# Patient Record
Sex: FEMALE | Race: WHITE | NOT HISPANIC OR LATINO | Employment: OTHER | URBAN - METROPOLITAN AREA
[De-identification: names, ages, dates, MRNs, and addresses within clinical notes are randomized per-mention and may not be internally consistent; named-entity substitution may affect disease eponyms.]

---

## 2023-10-11 DIAGNOSIS — M25.561 ACUTE PAIN OF RIGHT KNEE: ICD-10-CM

## 2023-10-11 RX ORDER — ATORVASTATIN CALCIUM 40 MG/1
40 TABLET, FILM COATED ORAL DAILY
COMMUNITY
Start: 2023-09-14

## 2023-10-11 RX ORDER — LORATADINE 10 MG/1
10 TABLET ORAL DAILY
COMMUNITY
Start: 2023-10-02

## 2023-10-11 RX ORDER — METOPROLOL TARTRATE 25 MG/1
25 TABLET, FILM COATED ORAL 2 TIMES DAILY
COMMUNITY
Start: 2023-09-14

## 2023-10-11 RX ORDER — CITALOPRAM 20 MG/1
20 TABLET, FILM COATED ORAL DAILY
COMMUNITY
Start: 2023-10-02

## 2023-10-11 RX ORDER — LOSARTAN POTASSIUM 100 MG/1
100 TABLET ORAL DAILY
COMMUNITY
Start: 2023-09-14

## 2023-10-11 RX ORDER — ARIPIPRAZOLE 2 MG/1
4 TABLET ORAL DAILY
COMMUNITY
Start: 2023-10-02 | End: 2024-05-14 | Stop reason: ALTCHOICE

## 2023-10-11 RX ORDER — AMOXICILLIN 500 MG/1
2000 CAPSULE ORAL
COMMUNITY
Start: 2023-06-01 | End: 2024-05-14 | Stop reason: ALTCHOICE

## 2023-10-11 RX ORDER — CLONAZEPAM 1 MG/1
1 TABLET ORAL 2 TIMES DAILY PRN
COMMUNITY
Start: 2023-09-22 | End: 2024-05-14 | Stop reason: ALTCHOICE

## 2023-10-12 ENCOUNTER — ANCILLARY PROCEDURE (OUTPATIENT)
Dept: RADIOLOGY | Facility: CLINIC | Age: 68
End: 2023-10-12
Payer: COMMERCIAL

## 2023-10-12 ENCOUNTER — OFFICE VISIT (OUTPATIENT)
Dept: ORTHOPEDIC SURGERY | Facility: CLINIC | Age: 68
End: 2023-10-12
Payer: COMMERCIAL

## 2023-10-12 DIAGNOSIS — M17.11 ARTHRITIS OF RIGHT KNEE: Primary | ICD-10-CM

## 2023-10-12 DIAGNOSIS — M25.561 ACUTE PAIN OF RIGHT KNEE: ICD-10-CM

## 2023-10-12 DIAGNOSIS — M25.561 CHRONIC PAIN OF RIGHT KNEE: ICD-10-CM

## 2023-10-12 DIAGNOSIS — G89.29 CHRONIC PAIN OF RIGHT KNEE: ICD-10-CM

## 2023-10-12 PROCEDURE — 1160F RVW MEDS BY RX/DR IN RCRD: CPT | Performed by: ORTHOPAEDIC SURGERY

## 2023-10-12 PROCEDURE — 1036F TOBACCO NON-USER: CPT | Performed by: ORTHOPAEDIC SURGERY

## 2023-10-12 PROCEDURE — 73562 X-RAY EXAM OF KNEE 3: CPT | Mod: RT,FY

## 2023-10-12 PROCEDURE — 73562 X-RAY EXAM OF KNEE 3: CPT | Mod: RIGHT SIDE | Performed by: RADIOLOGY

## 2023-10-12 PROCEDURE — 20610 DRAIN/INJ JOINT/BURSA W/O US: CPT | Performed by: ORTHOPAEDIC SURGERY

## 2023-10-12 PROCEDURE — 1159F MED LIST DOCD IN RCRD: CPT | Performed by: ORTHOPAEDIC SURGERY

## 2023-10-12 PROCEDURE — 99203 OFFICE O/P NEW LOW 30 MIN: CPT | Performed by: ORTHOPAEDIC SURGERY

## 2023-10-12 RX ORDER — LIDOCAINE HYDROCHLORIDE 10 MG/ML
2 INJECTION INFILTRATION; PERINEURAL
Status: COMPLETED | OUTPATIENT
Start: 2023-10-12 | End: 2023-10-12

## 2023-10-12 RX ORDER — TRIAMCINOLONE ACETONIDE 40 MG/ML
40 INJECTION, SUSPENSION INTRA-ARTICULAR; INTRAMUSCULAR
Status: COMPLETED | OUTPATIENT
Start: 2023-10-12 | End: 2023-10-12

## 2023-10-12 RX ADMIN — TRIAMCINOLONE ACETONIDE 40 MG: 40 INJECTION, SUSPENSION INTRA-ARTICULAR; INTRAMUSCULAR at 15:16

## 2023-10-12 RX ADMIN — LIDOCAINE HYDROCHLORIDE 2 ML: 10 INJECTION INFILTRATION; PERINEURAL at 15:16

## 2023-10-12 ASSESSMENT — ENCOUNTER SYMPTOMS: KNEE SWELLING: 1

## 2023-10-12 NOTE — PROGRESS NOTES
Subjective    Patient ID: TOSHIA Orosoc is a 68 y.o. female.    Chief Complaint: Pain of the Right Knee       68-year-old female presents for evaluation of right knee pain.  She states this has been ongoing for many years.  She has tried rest and modifications of activities as well as attempts at weight reduction which has been of little benefit.  She also occasionally uses oral anti-inflammatories over-the-counter which also have been of limited benefit.  He describes majority the pain along the medial aspect of the right knee.  Stiffness after prolonged sitting.  Pain which limits functions of ADL including standing, walking and stair climbing.  Has done well after previous left TKA performed 13 years ago.    This patient's past medical, social, and family history were reviewed as well as a review of systems including updates on the patient's information encounter sheet  Denies history diabetes    Physical Examination  Constitutional: Patient's height and weight reviewed, appears well kempt  Psychiatric: Alert and oriented ×3, appropriate mood and behavior  Pulmonary: Breathing appears nonlabored, no apparent distress  Lymphatic: No appreciable lymphadenopathy to both the upper and lower extremities  Skin: No open lesions, rashes, ulcerations  Neurologic: Gross motor and sensory exam appear intact (except for abnormalities noted in the below muscle skeletal exam)    Musculoskeletal: There appears to be satisfactory range of motion of the right hip without groin or thigh pain elicited.  The right knee reveals a lack of extension of 5 degrees and flexion limited to 115 degrees.  Patellofemoral crepitus with range of motion.  Localized medial joint line tenderness.  Mild varus alignment that does not correct to valgus stress.    X-rays reviewed today demonstrate arthritic changes right knee with near complete obliteration of medial joint space and osteophyte formation.    Assessment: Osteoarthritis right  knee    Plan: An extended discussion ensued with the patient regarding the treatment options for their knee condition. This included both nonoperative and operative treatment options.. The patient will continue with modifications of activities of daily living as well as an exercise program. As the patient desired an intra-articular knee injection of Kenalog/lidocaine was performed today and tolerated well.. The patient will observe to see if the injection is of benefit. Follow-up on a when necessary basis.    If the patient does not see improvement with this injection she will consider an evaluation by Dr. Miranda if she wishes to consider knee replacement in the winter 2024.    Right Knee     L Inj/Asp: R knee on 10/12/2023 3:16 PM  Indications: pain  Details: 22 G needle, anterolateral approach  Medications: 40 mg triamcinolone acetonide 40 mg/mL; 2 mL lidocaine 10 mg/mL (1 %)  Consent was given by the patient. Patient was prepped and draped in the usual sterile fashion.              Current Outpatient Medications:     ARIPiprazole (Abilify) 2 mg tablet, Take 2 tablets (4 mg) by mouth once daily., Disp: , Rfl:     atorvastatin (Lipitor) 40 mg tablet, Take 1 tablet (40 mg) by mouth once daily., Disp: , Rfl:     citalopram (CeleXA) 20 mg tablet, Take 1 tablet (20 mg) by mouth once daily., Disp: , Rfl:     loratadine (Claritin) 10 mg tablet, Take 1 tablet (10 mg) by mouth once daily., Disp: , Rfl:     losartan (Cozaar) 100 mg tablet, Take 1 tablet (100 mg) by mouth once daily., Disp: , Rfl:     metoprolol tartrate (Lopressor) 25 mg tablet, Take 1 tablet (25 mg) by mouth 2 times a day., Disp: , Rfl:     amoxicillin (Amoxil) 500 mg capsule, Take 4 capsules (2,000 mg) by mouth.  one hour prior to appointment, Disp: , Rfl:     clonazePAM (KlonoPIN) 1 mg tablet, Take 1 tablet (1 mg) by mouth 2 times a day as needed., Disp: , Rfl:

## 2024-04-30 ENCOUNTER — OFFICE VISIT (OUTPATIENT)
Dept: ORTHOPEDIC SURGERY | Facility: CLINIC | Age: 69
End: 2024-04-30
Payer: COMMERCIAL

## 2024-04-30 VITALS — WEIGHT: 186 LBS | BODY MASS INDEX: 32.96 KG/M2 | HEIGHT: 63 IN

## 2024-04-30 DIAGNOSIS — M17.11 ARTHRITIS OF KNEE, RIGHT: Primary | ICD-10-CM

## 2024-04-30 PROCEDURE — 1159F MED LIST DOCD IN RCRD: CPT | Performed by: ORTHOPAEDIC SURGERY

## 2024-04-30 PROCEDURE — 1160F RVW MEDS BY RX/DR IN RCRD: CPT | Performed by: ORTHOPAEDIC SURGERY

## 2024-04-30 PROCEDURE — 1036F TOBACCO NON-USER: CPT | Performed by: ORTHOPAEDIC SURGERY

## 2024-04-30 PROCEDURE — 99214 OFFICE O/P EST MOD 30 MIN: CPT | Performed by: ORTHOPAEDIC SURGERY

## 2024-04-30 RX ORDER — SODIUM CHLORIDE, SODIUM LACTATE, POTASSIUM CHLORIDE, CALCIUM CHLORIDE 600; 310; 30; 20 MG/100ML; MG/100ML; MG/100ML; MG/100ML
100 INJECTION, SOLUTION INTRAVENOUS CONTINUOUS
Status: CANCELLED | OUTPATIENT
Start: 2024-05-29

## 2024-04-30 RX ORDER — CEFAZOLIN SODIUM 2 G/100ML
2 INJECTION, SOLUTION INTRAVENOUS ONCE
Status: CANCELLED | OUTPATIENT
Start: 2024-05-29 | End: 2024-04-30

## 2024-04-30 RX ORDER — NAPROXEN SODIUM 220 MG/1
81 TABLET, FILM COATED ORAL DAILY
COMMUNITY
End: 2024-05-30 | Stop reason: HOSPADM

## 2024-04-30 NOTE — PROGRESS NOTES
68-year-old is seen with right knee pain.  She has been having persistent severe sharp shooting pain in the right knee.  Pain is worse with standing and walking.  Pain is worse going up and down stairs and getting up and down from a chair in and out of a car.  She is severely debilitated with the knee pain.  She has had pain for many years.  She had cortisone injection without relief.  She had viscosupplementation on her left knee that did not help her.  She has had her left knee replaced and is very satisfied with this.  She takes Tylenol and ibuprofen with minimal improvement.  She has a history of an MI in 2014 and has had stents placed.  No recent cardiac issues.    Pleasant and no acute distress. Walks with antalgic gait. Stands with varus alignment of the right knee and neutral on the left.  Right knee range of motion is 5-110°. The knee is stable to varus and valgus stress Lachman and posterior drawer. There is a mild effusion. There is generalized tenderness. Left knee range of motion is 0-120°. There is no effusion. The knee is stable to varus and valgus stress Lachman and posterior drawer. Both lower extremities are well perfused the skin is intact and muscle tone is adequate.    Multiple x-ray views of the right knee are personally reviewed and these demonstrate advanced degenerative changes with complete loss of joint space and osteophyte formation and subchondral sclerosis and cystic change.    The patient has undergone extensive conservative treatment but has persistent severe debilitating pain and advanced degenerative changes on x-ray.  Treatment options including no treatment reviewed and the decision was made to proceed with right total knee arthroplasty.  The surgery and postoperative course were reviewed in detail.  Risks including but not limited to infection thromboembolus neurovascular injury fracture bleeding medical problems stiffness and implant failure or loosening were discussed and they  understand this and have elected to proceed.  They will have medical clearance.  Avoidance of aggravating activities will be done in the meantime.  Intravenous TXA will be used at the time of the procedure.

## 2024-05-01 PROBLEM — M17.11 ARTHRITIS OF KNEE, RIGHT: Status: ACTIVE | Noted: 2024-04-30

## 2024-05-14 ENCOUNTER — PRE-ADMISSION TESTING (OUTPATIENT)
Dept: PREADMISSION TESTING | Facility: HOSPITAL | Age: 69
End: 2024-05-14
Payer: MEDICARE

## 2024-05-14 ENCOUNTER — HOSPITAL ENCOUNTER (OUTPATIENT)
Dept: RADIOLOGY | Facility: HOSPITAL | Age: 69
Discharge: HOME | End: 2024-05-14
Payer: MEDICARE

## 2024-05-14 VITALS
BODY MASS INDEX: 33.83 KG/M2 | RESPIRATION RATE: 18 BRPM | WEIGHT: 190.92 LBS | SYSTOLIC BLOOD PRESSURE: 169 MMHG | OXYGEN SATURATION: 97 % | DIASTOLIC BLOOD PRESSURE: 95 MMHG | TEMPERATURE: 98.2 F | HEIGHT: 63 IN | HEART RATE: 68 BPM

## 2024-05-14 DIAGNOSIS — M17.11 ARTHRITIS OF KNEE, RIGHT: ICD-10-CM

## 2024-05-14 DIAGNOSIS — T80.1XXA VASCULAR COMPLICATIONS FOLLOWING INFUSION, TRANSFUSION AND THERAPEUTIC INJECTION, INITIAL ENCOUNTER: ICD-10-CM

## 2024-05-14 DIAGNOSIS — Z01.818 PREOP TESTING: Primary | ICD-10-CM

## 2024-05-14 LAB
APTT PPP: 30 SECONDS (ref 27–38)
INR PPP: 0.9 (ref 0.9–1.1)
PROTHROMBIN TIME: 10.5 SECONDS (ref 9.8–12.8)

## 2024-05-14 PROCEDURE — 73562 X-RAY EXAM OF KNEE 3: CPT | Mod: RIGHT SIDE | Performed by: RADIOLOGY

## 2024-05-14 PROCEDURE — 73562 X-RAY EXAM OF KNEE 3: CPT | Mod: RT

## 2024-05-14 PROCEDURE — 87081 CULTURE SCREEN ONLY: CPT | Mod: PARLAB

## 2024-05-14 PROCEDURE — 36415 COLL VENOUS BLD VENIPUNCTURE: CPT

## 2024-05-14 PROCEDURE — 85610 PROTHROMBIN TIME: CPT

## 2024-05-14 RX ORDER — BUTALB/ACETAMINOPHEN/CAFFEINE 50-325-40
1 TABLET ORAL DAILY
COMMUNITY

## 2024-05-14 RX ORDER — OMEGA-3-ACID ETHYL ESTERS 1 G/1
1 CAPSULE, LIQUID FILLED ORAL 2 TIMES DAILY
COMMUNITY
End: 2024-05-30 | Stop reason: HOSPADM

## 2024-05-14 RX ORDER — VITAMIN E MIXED 400 UNIT
400 CAPSULE ORAL DAILY
COMMUNITY
End: 2024-05-30 | Stop reason: HOSPADM

## 2024-05-14 RX ORDER — UBIDECARENONE 75 MG
500 CAPSULE ORAL DAILY
COMMUNITY

## 2024-05-14 RX ORDER — DOCUSATE SODIUM 250 MG
2 CAPSULE ORAL DAILY
COMMUNITY

## 2024-05-14 ASSESSMENT — DUKE ACTIVITY SCORE INDEX (DASI)
CAN YOU WALK INDOORS, SUCH AS AROUND YOUR HOUSE: YES
CAN YOU DO YARD WORK LIKE RAKING LEAVES, WEEDING OR PUSHING A MOWER: NO
CAN YOU DO HEAVY WORK AROUND THE HOUSE LIKE SCRUBBING FLOORS OR LIFTING AND MOVING HEAVY FURNITURE: NO
CAN YOU TAKE CARE OF YOURSELF (EAT, DRESS, BATHE, OR USE TOILET): YES
CAN YOU WALK A BLOCK OR TWO ON LEVEL GROUND: YES
CAN YOU CLIMB A FLIGHT OF STAIRS OR WALK UP A HILL: YES
CAN YOU PARTICIPATE IN MODERATE RECREATIONAL ACTIVITIES LIKE GOLF, BOWLING, DANCING, DOUBLES TENNIS OR THROWING A BASEBALL OR FOOTBALL: NO
CAN YOU RUN A SHORT DISTANCE: NO
CAN YOU HAVE SEXUAL RELATIONS: NO
TOTAL_SCORE: 18.95
CAN YOU DO MODERATE WORK AROUND THE HOUSE LIKE VACUUMING, SWEEPING FLOORS OR CARRYING GROCERIES: YES
CAN YOU DO LIGHT WORK AROUND THE HOUSE LIKE DUSTING OR WASHING DISHES: YES
DASI METS SCORE: 5.1
CAN YOU PARTICIPATE IN STRENOUS SPORTS LIKE SWIMMING, SINGLES TENNIS, FOOTBALL, BASKETBALL, OR SKIING: NO

## 2024-05-14 NOTE — PREPROCEDURE INSTRUCTIONS
Medication List            Accurate as of May 14, 2024 11:37 AM. Always use your most recent med list.                aspirin 81 mg chewable tablet  Medication Adjustments for Surgery: Other (Comment)  Notes to patient: Stop 5 days before surgery     atorvastatin 40 mg tablet  Commonly known as: Lipitor  Medication Adjustments for Surgery: Take morning of surgery with sip of water, no other fluids     calcium citrate-vitamin D3 315 mg-5 mcg (200 unit) tablet  Commonly known as: Citracal+D  Medication Adjustments for Surgery: Stop 7 days before surgery     citalopram 20 mg tablet  Commonly known as: CeleXA  Medication Adjustments for Surgery: Take morning of surgery with sip of water, no other fluids     cyanocobalamin 500 mcg tablet  Commonly known as: Vitamin B-12  Medication Adjustments for Surgery: Stop 7 days before surgery     docusate sodium 250 mg capsule  Medication Adjustments for Surgery: Stop 1 day before surgery     loratadine 10 mg tablet  Commonly known as: Claritin  Medication Adjustments for Surgery: Take morning of surgery with sip of water, no other fluids     losartan 100 mg tablet  Commonly known as: Cozaar  Medication Adjustments for Surgery: Other (Comment)  Notes to patient: Hold the night before and the day of surgery     metoprolol tartrate 25 mg tablet  Commonly known as: Lopressor  Medication Adjustments for Surgery: Take morning of surgery with sip of water, no other fluids     omega-3 acid ethyl esters 1 gram capsule  Commonly known as: Lovaza  Medication Adjustments for Surgery: Stop 7 days before surgery     vitamin E 180 mg (400 unit) capsule  Medication Adjustments for Surgery: Stop 7 days before surgery                        PRE-OPERATIVE INSTRUCTIONS FOR SURGERY    *Do not eat anything after midnight the night of surgery.  This includes food of any kind (including hard candy, cough drops, mints).   You may have up to 13.5 ounces of clear liquid  until TWO hours prior to your  arrival time to the hospital.  This includes water, black tea/coffee, (no milk or cream) apple juice and electrolyte drinks (GATORADE).  You may chew gum until TWO hours prior you your surgery/procedure.         *One of our staff members will call you ONE business day before your surgery, between 11 am-2 pm to let you know the time to arrive.  If you have not received a call by 2 pm, call [phone]  *When you arrive at the hospital-->GO TO Registration on the ground floor  *Stop smoking 24 hours prior to surgery.  No Marijuana, CBD Oil or Vaping for 48 hours  *No alcohol 24 hours prior to surgery  *You will need a responsible adult to drive you home  -No acrylic nails or nail polish on at least one fingernail, NO polish on toes for foot surgery  -You may be asked to remove your dentures, partial plate, eyeglasses or contact lenses before going to surgery.  Please bring a case for these items.  -Body piercings need to be removed.  Jewelry and valuables should be left at home.  -Put on loose,  comfortable, clean clothing, that will accommodate bandages        What is a home antibacterial shower?  This shower is a way of cleaning the skin with a germ killing solution before surgery.  The solution contains chlorhexidine, commonly known as CHG.  CHG is a skin cleanser with germ killing ability.  Let your doctor know if you are allergic to chlorhexidine.    Why do I need to take a preoperative antibacterial shower?  Skin is not sterile.  It is best to try to make your skin as free of germs as possible before surgery.  Proper cleansing with a germ killing soap before surgery can lower the number of germs on your skin.  This helps to reduce the risk of infection at the surgical site.  Following the instructions listed below will help you prepare your skin for surgery.      How do I use the solution?    Steps: Begin using your CHG soap 5 days before your surgery on __________________.    *First, wash and rinse your hair  using the CHG soap.  Keep CHG soap away from ear canals and eyes.   Rinse completely, do not condition.  Hair extensions should be removed.    *Wash your face with your normal soap and rinse.   *Apply the CHG solution to a clean wet washcloth.  Turn the water off or move away from the water spray to avoid premature rinsing of the CHG soap as you are applying.  Firmly lather your entire body from the neck down.  Do not use on your face.    *Pay special attention to the area(s) where your incision(s) will be located unless they are on your face.  Avoid scrubbing your skin too hard.  The important part is to have the CHG soap sit on your skin for 3 minutes.   *When the 3 minutes are up, turn on the water and rinse the CHG solution off your body completely.  *Do not wash with regular soap after you  have  used the CHG soap solution.  *Pat  yourself dry with a clean, freshly laundered towel.  *Do not apply powders, deodorants or lotions.  *Dress in clean freshly laundered night clothes.    *Be sure to sleep with clean freshly laundered sheets.    *Be aware the CHG will cause stains on fabrics; if you wash them with bleach after use.  Rinse your washcloth and other linens that have contact with CHG completely.  Use only non-chlorine detergents to launder the items  used.  *The morning of surgery is the fifth day.  Repeat the above steps and dress in clean comfortable clothing.     What is oral/dental rinse?  It is mouthwash.  It is a way of cleaning the he mouth with a germ-killing solution before your surgery.  The solution contains chlorhexidine, commonly known as CHG.  It is used inside the mouth to kill a bacteria known as Staphylococcus aureus.  Let your doctor know if you are allergic to Chlorhexidine.    Why do I need to use CHG oral/ dental rinse?  The CHG oral/dental rinse helps to kill bacteria in your mouth know as Staphylococcus aureus.  This reduces the risk of infection at the surgical site.    Using your  CHG oral/dental rinse    STEPS:    Use your CHG oral/dental rinse after you brush your teeth the night before (at bedtime) and the morning of your surgery.  Follow all the directions on your prescription label.  *Use the cap on the container to measure 15 ml  *Swish (gargle if you can) the mouthwash in your mouth for at least 30 seconds, (do not swallow) and spit out  *After you use your CHG rinse, do not rinse your mouth with water, drink or eat.  Please refer to the prescription label for the appropriate time to resume oral intake.    What side effects might I have using the CHG oral/dental rinse?  CHG rinse will stick you plaque on the teeth.  Brush and floss just before use.   Teeth brushing will help avoid staining of the plaque during  use.  Teeth brushing will help avoid staining of plaque during  use.    Who should I contact if I have questions about the CHG oral/dental rinse and or CHG soap?  Please call [facility], Pre-Admission testing at [phone] if you have any questions.    What you may be asked to bring to surgery:  ___Crutches, walker    NPO Instructions:    Do not eat any food after midnight the night before your surgery/procedure.  You may chew gum up to TWO hours before your surgery/procedure.    Additional Instructions:     Day of Surgery:  You may chew gum up to TWO hours before your surgery/procedure  Wear  comfortable loose fitting clothing  Do not use moisturizers, creams, lotions or perfume  All jewelry and valuables should be left at home

## 2024-05-14 NOTE — CPM/PAT H&P
CPM/PAT Evaluation       Name: Thelma Orosco (Thelma Orosco)  /Age: 1955/68 y.o.     In-Person       Chief Complaint: R knee pain    HPI    Ms. Orosco is a pleasant 68 year old female who presents to the clinic today for preoperative evaluation for upcoming Right Knee Arthroplasty with Dr. Miranda on 2024.    She reports her right knee pain started 5 years ago and getting progressively worse in the past year. In the past she has tried cortisone injections, however recently, with the last one being  she had one without much relief. She reports the pain primarily on the lateral side of the patella radiating down the lateral aspect of the right shin.     She reports her pain to be intermittent, throbbing and sharp, which gets worse when walking, however can also be present when she is laying down in bed as well.  Onset: 5 years ago, has been getting worse in the past year. Outer lateral side radiating down the lateral aspect of the shin. Her pain also does improve with stretching.    She reports taking 2- extra strength tylenol once daily alternating on other days with 3 aleve tablets.     Review of Systems  Constitutional: NO F, chills, +intermittent hot flashes  Eyes: + glasses no blurred vision or visual disturbance  ENT: denies congestion, sore throat, difficulty hearing  Cardiovascular: no chest pain, no edema, no palps and no syncope.   Respiratory: no cough,no s.o.b. and no wheezing  Gastrointestinal: no abdominal pain, no N/V, no blood in stools  Genitourinary: no dysuria, no urinary frequency, no urinary hesitancy and no feelings of urinary urgency.   Musculoskeletal: +right knee pain,  no back pain and no myalgias.   Integumentary: no new skin lesions and no rashes.   Neurological: no difficulty walking, no headache, no limb weakness, no numbness and no tingling.   Endocrine: no recent weight gain and no recent weight loss.   Hematologic/Lymphatic: no tendency for easy bruising and  no swollen glands.           Past Medical History:   Diagnosis Date    Coronary artery disease     Depression     Hyperlipidemia     Hypertension     Myocardial infarction (Multi)        Past Surgical History:   Procedure Laterality Date    CARDIAC CATHETERIZATION      CORONARY STENT PLACEMENT         Patient  has no history on file for sexual activity.    No family history on file.    Allergies   Allergen Reactions    Codeine Other       Prior to Admission medications    Medication Sig Start Date End Date Taking? Authorizing Provider   aspirin 81 mg chewable tablet Chew 1 tablet (81 mg) once daily.   Yes Historical Provider, MD   atorvastatin (Lipitor) 40 mg tablet Take 1 tablet (40 mg) by mouth once daily. 9/14/23  Yes Historical Provider, MD   citalopram (CeleXA) 20 mg tablet Take 1 tablet (20 mg) by mouth once daily. 10/2/23  Yes Historical Provider, MD   loratadine (Claritin) 10 mg tablet Take 1 tablet (10 mg) by mouth once daily. 10/2/23  Yes Historical Provider, MD   losartan (Cozaar) 100 mg tablet Take 1 tablet (100 mg) by mouth once daily. 9/14/23  Yes Historical Provider, MD   metoprolol tartrate (Lopressor) 25 mg tablet Take 1 tablet (25 mg) by mouth 2 times a day. 9/14/23  Yes Historical Provider, MD   calcium citrate-vitamin D3 (Citracal+D) 315 mg-5 mcg (200 unit) tablet Take 1 tablet by mouth once daily.    Historical Provider, MD   cyanocobalamin (Vitamin B-12) 500 mcg tablet Take 1 tablet (500 mcg) by mouth once daily.    Historical Provider, MD   docusate sodium 250 mg capsule Take by mouth.    Historical Provider, MD   omega-3 acid ethyl esters (Lovaza) 1 gram capsule Take 1 capsule (1 g) by mouth 2 times a day.    Historical Provider, MD   vitamin E 180 mg (400 unit) capsule Take 1 capsule (400 Units) by mouth once daily.    Historical Provider, MD   amoxicillin (Amoxil) 500 mg capsule Take 4 capsules (2,000 mg) by mouth.  one hour prior to appointment 6/1/23 5/14/24  Historical Provider, MD    ARIPiprazole (Abilify) 2 mg tablet Take 2 tablets (4 mg) by mouth once daily. 10/2/23 5/14/24  Historical Provider, MD   clonazePAM (KlonoPIN) 1 mg tablet Take 1 tablet (1 mg) by mouth 2 times a day as needed. 9/22/23 5/14/24  Historical Provider, MD          Physical Exam  Constitutional:       Appearance: Normal appearance.   HENT:      Head: Normocephalic and atraumatic.      Nose: Nose normal.      Mouth/Throat:      Mouth: Mucous membranes are moist.   Eyes:      Conjunctiva/sclera: Conjunctivae normal.   Cardiovascular:      Rate and Rhythm: Regular rhythm.   Pulmonary:      Effort: Pulmonary effort is normal.      Breath sounds: Normal breath sounds.   Abdominal:      General: Bowel sounds are normal.      Palpations: Abdomen is soft.   Musculoskeletal:      Cervical back: Normal range of motion.      Left knee: Swelling present. Decreased range of motion. Tenderness present.      Comments: + antalgic gait   Skin:     General: Skin is dry.   Neurological:      General: No focal deficit present.      Mental Status: She is alert and oriented to person, place, and time.          PAT AIRWAY:   Airway:     Mallampati::  IV    TM distance::  >3 FB      Visit Vitals  BP (!) 169/95   Pulse 68   Temp 36.8 °C (98.2 °F) (Temporal)   Resp 18       DASI Risk Score      Flowsheet Row Most Recent Value   DASI SCORE 18.95   METS Score (Will be calculated only when all the questions are answered) 5.1          Caprini DVT Assessment    No data to display       Modified Frailty Index    No data to display       CHADS2 Stroke Risk  Current as of about an hour ago        N/A 3 to 100%: High Risk   2 to < 3%: Medium Risk   0 to < 2%: Low Risk     Last Change: N/A          This score determines the patient's risk of having a stroke if the patient has atrial fibrillation.        This score is not applicable to this patient. Components are not calculated.          Revised Cardiac Risk Index    No data to display       Apfel  Simplified Score    No data to display       Risk Analysis Index Results This Encounter    No data found in the last 1 encounters.       Stop Bang Score      Flowsheet Row Most Recent Value   Do you snore loudly? 1   Do you often feel tired or fatigued after your sleep? 0   Has anyone ever observed you stop breathing in your sleep? 0   Do you have or are you being treated for high blood pressure? 1   Recent BMI (Calculated) 33   Is BMI greater than 35 kg/m2? 0=No   Age older than 50 years old? 1=Yes   Is your neck circumference greater than 17 inches (Male) or 16 inches (Female)? 0   Gender - Male 0=No   STOP-BANG Total Score 3          NP student attestation:    Trainee role CHARLENE student: Vicki Aponte  Trainee discussed patient with JUDY Lyle    Trainee role:         I was present with the trainee who participated in the documentation of this note. I have personally seen and re-examined the patient, and performed the medical decision-making components, assessment and plan of care. I have reviewed the trainee's documentation and verified the findings in the note as written, with additions or exceptions as stated in the body of this note.      67 yo female with c/o right knee pain  She c/o pain to lateral aspect of knee with radiation to mid shin.  She states injections not helpful anymore.  She rates pain a 5/10, and treats with Alleve or Tylenol PRN.    Right total knee replacement is scheduled 5/29/2024 with Dr. Miranda.    ROS/PE-->see above.      ASSESSMENT  Obesity BMI 33.82    NSTEMI/HLD/HTN  Takes ASA, atorvastatin, losartan, metoprolol  H/O ALENA --> LAD 5/2025, Dr. Cosme  Reji  followed Dr. Bauman/ [facility]--now following PCP  Last echo 5/2015 LVEF 60-65%     ECG 5/6/2024 NSR, 62 bpm         ANESTHESIA FINDINGS:  Intubation History: No history of difficult intubation  Significant Anesthesia Considerations:      Airway History: No abnormal airway history  Predictors of Difficult Airway Management  ?  Obesity  ? Short thick neck     PLAN  This patient is optimally prepared for surgery.     CONSULTS:    Patient does not require consults for optimization at this time.     The Following Tests/Procedures Have Been Initiated:  CBC, BMP, Coag screen, MRSA screen, ECG     Planned Anesthetic: general     Instructions Given to Patient:  Patient given verbal and written preop instructions and voices comprehension and compliance.     No further testing required.

## 2024-05-14 NOTE — H&P (VIEW-ONLY)
CPM/PAT Evaluation       Name: Thelma Orosco (Thelma Orosco)  /Age: 1955/68 y.o.     In-Person       Chief Complaint: R knee pain    HPI    Ms. Orosco is a pleasant 68 year old female who presents to the clinic today for preoperative evaluation for upcoming Right Knee Arthroplasty with Dr. Miranda on 2024.    She reports her right knee pain started 5 years ago and getting progressively worse in the past year. In the past she has tried cortisone injections, however recently, with the last one being  she had one without much relief. She reports the pain primarily on the lateral side of the patella radiating down the lateral aspect of the right shin.     She reports her pain to be intermittent, throbbing and sharp, which gets worse when walking, however can also be present when she is laying down in bed as well.  Onset: 5 years ago, has been getting worse in the past year. Outer lateral side radiating down the lateral aspect of the shin. Her pain also does improve with stretching.    She reports taking 2- extra strength tylenol once daily alternating on other days with 3 aleve tablets.     Review of Systems  Constitutional: NO F, chills, +intermittent hot flashes  Eyes: + glasses no blurred vision or visual disturbance  ENT: denies congestion, sore throat, difficulty hearing  Cardiovascular: no chest pain, no edema, no palps and no syncope.   Respiratory: no cough,no s.o.b. and no wheezing  Gastrointestinal: no abdominal pain, no N/V, no blood in stools  Genitourinary: no dysuria, no urinary frequency, no urinary hesitancy and no feelings of urinary urgency.   Musculoskeletal: +right knee pain,  no back pain and no myalgias.   Integumentary: no new skin lesions and no rashes.   Neurological: no difficulty walking, no headache, no limb weakness, no numbness and no tingling.   Endocrine: no recent weight gain and no recent weight loss.   Hematologic/Lymphatic: no tendency for easy bruising and  no swollen glands.           Past Medical History:   Diagnosis Date    Coronary artery disease     Depression     Hyperlipidemia     Hypertension     Myocardial infarction (Multi)        Past Surgical History:   Procedure Laterality Date    CARDIAC CATHETERIZATION      CORONARY STENT PLACEMENT         Patient  has no history on file for sexual activity.    No family history on file.    Allergies   Allergen Reactions    Codeine Other       Prior to Admission medications    Medication Sig Start Date End Date Taking? Authorizing Provider   aspirin 81 mg chewable tablet Chew 1 tablet (81 mg) once daily.   Yes Historical Provider, MD   atorvastatin (Lipitor) 40 mg tablet Take 1 tablet (40 mg) by mouth once daily. 9/14/23  Yes Historical Provider, MD   citalopram (CeleXA) 20 mg tablet Take 1 tablet (20 mg) by mouth once daily. 10/2/23  Yes Historical Provider, MD   loratadine (Claritin) 10 mg tablet Take 1 tablet (10 mg) by mouth once daily. 10/2/23  Yes Historical Provider, MD   losartan (Cozaar) 100 mg tablet Take 1 tablet (100 mg) by mouth once daily. 9/14/23  Yes Historical Provider, MD   metoprolol tartrate (Lopressor) 25 mg tablet Take 1 tablet (25 mg) by mouth 2 times a day. 9/14/23  Yes Historical Provider, MD   calcium citrate-vitamin D3 (Citracal+D) 315 mg-5 mcg (200 unit) tablet Take 1 tablet by mouth once daily.    Historical Provider, MD   cyanocobalamin (Vitamin B-12) 500 mcg tablet Take 1 tablet (500 mcg) by mouth once daily.    Historical Provider, MD   docusate sodium 250 mg capsule Take by mouth.    Historical Provider, MD   omega-3 acid ethyl esters (Lovaza) 1 gram capsule Take 1 capsule (1 g) by mouth 2 times a day.    Historical Provider, MD   vitamin E 180 mg (400 unit) capsule Take 1 capsule (400 Units) by mouth once daily.    Historical Provider, MD   amoxicillin (Amoxil) 500 mg capsule Take 4 capsules (2,000 mg) by mouth.  one hour prior to appointment 6/1/23 5/14/24  Historical Provider, MD    ARIPiprazole (Abilify) 2 mg tablet Take 2 tablets (4 mg) by mouth once daily. 10/2/23 5/14/24  Historical Provider, MD   clonazePAM (KlonoPIN) 1 mg tablet Take 1 tablet (1 mg) by mouth 2 times a day as needed. 9/22/23 5/14/24  Historical Provider, MD          Physical Exam  Constitutional:       Appearance: Normal appearance.   HENT:      Head: Normocephalic and atraumatic.      Nose: Nose normal.      Mouth/Throat:      Mouth: Mucous membranes are moist.   Eyes:      Conjunctiva/sclera: Conjunctivae normal.   Cardiovascular:      Rate and Rhythm: Regular rhythm.   Pulmonary:      Effort: Pulmonary effort is normal.      Breath sounds: Normal breath sounds.   Abdominal:      General: Bowel sounds are normal.      Palpations: Abdomen is soft.   Musculoskeletal:      Cervical back: Normal range of motion.      Left knee: Swelling present. Decreased range of motion. Tenderness present.      Comments: + antalgic gait   Skin:     General: Skin is dry.   Neurological:      General: No focal deficit present.      Mental Status: She is alert and oriented to person, place, and time.          PAT AIRWAY:   Airway:     Mallampati::  IV    TM distance::  >3 FB      Visit Vitals  BP (!) 169/95   Pulse 68   Temp 36.8 °C (98.2 °F) (Temporal)   Resp 18       DASI Risk Score      Flowsheet Row Most Recent Value   DASI SCORE 18.95   METS Score (Will be calculated only when all the questions are answered) 5.1          Caprini DVT Assessment    No data to display       Modified Frailty Index    No data to display       CHADS2 Stroke Risk  Current as of about an hour ago        N/A 3 to 100%: High Risk   2 to < 3%: Medium Risk   0 to < 2%: Low Risk     Last Change: N/A          This score determines the patient's risk of having a stroke if the patient has atrial fibrillation.        This score is not applicable to this patient. Components are not calculated.          Revised Cardiac Risk Index    No data to display       Apfel  Simplified Score    No data to display       Risk Analysis Index Results This Encounter    No data found in the last 1 encounters.       Stop Bang Score      Flowsheet Row Most Recent Value   Do you snore loudly? 1   Do you often feel tired or fatigued after your sleep? 0   Has anyone ever observed you stop breathing in your sleep? 0   Do you have or are you being treated for high blood pressure? 1   Recent BMI (Calculated) 33   Is BMI greater than 35 kg/m2? 0=No   Age older than 50 years old? 1=Yes   Is your neck circumference greater than 17 inches (Male) or 16 inches (Female)? 0   Gender - Male 0=No   STOP-BANG Total Score 3          NP student attestation:    Trainee role CHARLENE student: Vicki Aponte  Trainee discussed patient with JUDY Lyle    Trainee role:         I was present with the trainee who participated in the documentation of this note. I have personally seen and re-examined the patient, and performed the medical decision-making components, assessment and plan of care. I have reviewed the trainee's documentation and verified the findings in the note as written, with additions or exceptions as stated in the body of this note.      69 yo female with c/o right knee pain  She c/o pain to lateral aspect of knee with radiation to mid shin.  She states injections not helpful anymore.  She rates pain a 5/10, and treats with Alleve or Tylenol PRN.    Right total knee replacement is scheduled 5/29/2024 with Dr. Miranda.    ROS/PE-->see above.      ASSESSMENT  Obesity BMI 33.82    NSTEMI/HLD/HTN  Takes ASA, atorvastatin, losartan, metoprolol  H/O ALENA --> LAD 5/2025, Dr. Cosme  Reji  followed Dr. Bauman/ [facility]--now following PCP  Last echo 5/2015 LVEF 60-65%     ECG 5/6/2024 NSR, 62 bpm         ANESTHESIA FINDINGS:  Intubation History: No history of difficult intubation  Significant Anesthesia Considerations:      Airway History: No abnormal airway history  Predictors of Difficult Airway Management  ?  Obesity  ? Short thick neck     PLAN  This patient is optimally prepared for surgery.     CONSULTS:    Patient does not require consults for optimization at this time.     The Following Tests/Procedures Have Been Initiated:  CBC, BMP, Coag screen, MRSA screen, ECG     Planned Anesthetic: general     Instructions Given to Patient:  Patient given verbal and written preop instructions and voices comprehension and compliance.     No further testing required.

## 2024-05-16 LAB — STAPHYLOCOCCUS SPEC CULT: NORMAL

## 2024-05-23 RX ORDER — CHLORHEXIDINE GLUCONATE 40 MG/ML
SOLUTION TOPICAL DAILY
Qty: 118 ML | Refills: 0 | Status: SHIPPED | OUTPATIENT
Start: 2024-05-23 | End: 2024-05-30 | Stop reason: HOSPADM

## 2024-05-23 RX ORDER — CHLORHEXIDINE GLUCONATE ORAL RINSE 1.2 MG/ML
15 SOLUTION DENTAL AS NEEDED
Qty: 120 ML | Refills: 0 | Status: SHIPPED | OUTPATIENT
Start: 2024-05-23 | End: 2024-05-30 | Stop reason: HOSPADM

## 2024-05-29 ENCOUNTER — HOSPITAL ENCOUNTER (OUTPATIENT)
Facility: HOSPITAL | Age: 69
Discharge: HOME HEALTH | End: 2024-05-30
Attending: ORTHOPAEDIC SURGERY | Admitting: ORTHOPAEDIC SURGERY
Payer: MEDICARE

## 2024-05-29 ENCOUNTER — APPOINTMENT (OUTPATIENT)
Dept: RADIOLOGY | Facility: HOSPITAL | Age: 69
End: 2024-05-29
Payer: MEDICARE

## 2024-05-29 ENCOUNTER — ANESTHESIA (OUTPATIENT)
Dept: OPERATING ROOM | Facility: HOSPITAL | Age: 69
End: 2024-05-29
Payer: MEDICARE

## 2024-05-29 ENCOUNTER — ANESTHESIA EVENT (OUTPATIENT)
Dept: OPERATING ROOM | Facility: HOSPITAL | Age: 69
End: 2024-05-29
Payer: MEDICARE

## 2024-05-29 DIAGNOSIS — R11.2 PONV (POSTOPERATIVE NAUSEA AND VOMITING): Primary | ICD-10-CM

## 2024-05-29 DIAGNOSIS — M17.11 ARTHRITIS OF KNEE, RIGHT: ICD-10-CM

## 2024-05-29 DIAGNOSIS — Z98.890 PONV (POSTOPERATIVE NAUSEA AND VOMITING): Primary | ICD-10-CM

## 2024-05-29 PROBLEM — E78.5 HYPERLIPIDEMIA: Status: ACTIVE | Noted: 2024-05-29

## 2024-05-29 PROBLEM — Z95.5 STENTED CORONARY ARTERY: Status: ACTIVE | Noted: 2024-05-29

## 2024-05-29 PROBLEM — I10 PRIMARY HYPERTENSION: Status: ACTIVE | Noted: 2024-05-29

## 2024-05-29 PROBLEM — I25.10 CAD (CORONARY ARTERY DISEASE): Status: ACTIVE | Noted: 2024-05-29

## 2024-05-29 PROCEDURE — 2720000007 HC OR 272 NO HCPCS: Performed by: ORTHOPAEDIC SURGERY

## 2024-05-29 PROCEDURE — 2500000004 HC RX 250 GENERAL PHARMACY W/ HCPCS (ALT 636 FOR OP/ED): Performed by: ORTHOPAEDIC SURGERY

## 2024-05-29 PROCEDURE — 3700000002 HC GENERAL ANESTHESIA TIME - EACH INCREMENTAL 1 MINUTE: Performed by: ORTHOPAEDIC SURGERY

## 2024-05-29 PROCEDURE — 2500000001 HC RX 250 WO HCPCS SELF ADMINISTERED DRUGS (ALT 637 FOR MEDICARE OP): Performed by: ANESTHESIOLOGY

## 2024-05-29 PROCEDURE — G0378 HOSPITAL OBSERVATION PER HR: HCPCS

## 2024-05-29 PROCEDURE — A4217 STERILE WATER/SALINE, 500 ML: HCPCS | Performed by: ORTHOPAEDIC SURGERY

## 2024-05-29 PROCEDURE — 2500000004 HC RX 250 GENERAL PHARMACY W/ HCPCS (ALT 636 FOR OP/ED): Mod: JZ | Performed by: ORTHOPAEDIC SURGERY

## 2024-05-29 PROCEDURE — 2500000001 HC RX 250 WO HCPCS SELF ADMINISTERED DRUGS (ALT 637 FOR MEDICARE OP): Performed by: CLINICAL NURSE SPECIALIST

## 2024-05-29 PROCEDURE — 2500000001 HC RX 250 WO HCPCS SELF ADMINISTERED DRUGS (ALT 637 FOR MEDICARE OP): Performed by: ORTHOPAEDIC SURGERY

## 2024-05-29 PROCEDURE — 2780000003 HC OR 278 NO HCPCS: Performed by: ORTHOPAEDIC SURGERY

## 2024-05-29 PROCEDURE — 2500000005 HC RX 250 GENERAL PHARMACY W/O HCPCS: Performed by: ORTHOPAEDIC SURGERY

## 2024-05-29 PROCEDURE — 2500000004 HC RX 250 GENERAL PHARMACY W/ HCPCS (ALT 636 FOR OP/ED)

## 2024-05-29 PROCEDURE — A6213 FOAM DRG >16<=48 SQ IN W/BDR: HCPCS | Performed by: ORTHOPAEDIC SURGERY

## 2024-05-29 PROCEDURE — 3600000005 HC OR TIME - INITIAL BASE CHARGE - PROCEDURE LEVEL FIVE: Performed by: ORTHOPAEDIC SURGERY

## 2024-05-29 PROCEDURE — 7100000011 HC EXTENDED STAY RECOVERY HOURLY - NURSING UNIT

## 2024-05-29 PROCEDURE — 73560 X-RAY EXAM OF KNEE 1 OR 2: CPT | Mod: RT

## 2024-05-29 PROCEDURE — C1776 JOINT DEVICE (IMPLANTABLE): HCPCS | Performed by: ORTHOPAEDIC SURGERY

## 2024-05-29 PROCEDURE — 27447 TOTAL KNEE ARTHROPLASTY: CPT | Performed by: ORTHOPAEDIC SURGERY

## 2024-05-29 PROCEDURE — 99221 1ST HOSP IP/OBS SF/LOW 40: CPT | Performed by: INTERNAL MEDICINE

## 2024-05-29 PROCEDURE — 2500000004 HC RX 250 GENERAL PHARMACY W/ HCPCS (ALT 636 FOR OP/ED): Performed by: ANESTHESIOLOGY

## 2024-05-29 PROCEDURE — 3700000001 HC GENERAL ANESTHESIA TIME - INITIAL BASE CHARGE: Performed by: ORTHOPAEDIC SURGERY

## 2024-05-29 PROCEDURE — C1713 ANCHOR/SCREW BN/BN,TIS/BN: HCPCS | Performed by: ORTHOPAEDIC SURGERY

## 2024-05-29 PROCEDURE — 2500000005 HC RX 250 GENERAL PHARMACY W/O HCPCS: Performed by: ANESTHESIOLOGIST ASSISTANT

## 2024-05-29 PROCEDURE — 7100000002 HC RECOVERY ROOM TIME - EACH INCREMENTAL 1 MINUTE: Performed by: ORTHOPAEDIC SURGERY

## 2024-05-29 PROCEDURE — 2500000004 HC RX 250 GENERAL PHARMACY W/ HCPCS (ALT 636 FOR OP/ED): Performed by: ANESTHESIOLOGIST ASSISTANT

## 2024-05-29 PROCEDURE — 2500000004 HC RX 250 GENERAL PHARMACY W/ HCPCS (ALT 636 FOR OP/ED): Performed by: CLINICAL NURSE SPECIALIST

## 2024-05-29 PROCEDURE — 3600000010 HC OR TIME - EACH INCREMENTAL 1 MINUTE - PROCEDURE LEVEL FIVE: Performed by: ORTHOPAEDIC SURGERY

## 2024-05-29 PROCEDURE — 7100000001 HC RECOVERY ROOM TIME - INITIAL BASE CHARGE: Performed by: ORTHOPAEDIC SURGERY

## 2024-05-29 PROCEDURE — 2500000001 HC RX 250 WO HCPCS SELF ADMINISTERED DRUGS (ALT 637 FOR MEDICARE OP): Performed by: NURSE PRACTITIONER

## 2024-05-29 PROCEDURE — 73560 X-RAY EXAM OF KNEE 1 OR 2: CPT | Mod: RIGHT SIDE | Performed by: RADIOLOGY

## 2024-05-29 DEVICE — ATTUNE PATELLA MEDIALIZED DOME 32MM CEMENTED AOX
Type: IMPLANTABLE DEVICE | Site: KNEE | Status: FUNCTIONAL
Brand: ATTUNE

## 2024-05-29 DEVICE — IMPLANTABLE DEVICE
Type: IMPLANTABLE DEVICE | Site: KNEE | Status: FUNCTIONAL
Brand: BIOMET® BONE CEMENT R

## 2024-05-29 DEVICE — ATTUNE KNEE SYSTEM TIBIAL BASE FIXED BEARING SIZE 3 CEMENTED
Type: IMPLANTABLE DEVICE | Site: KNEE | Status: FUNCTIONAL
Brand: ATTUNE

## 2024-05-29 DEVICE — ATTUNE KNEE SYSTEM TIBIAL INSERT FIXED BEARING CRUCIATE RETAINING 5 7MM AOX
Type: IMPLANTABLE DEVICE | Site: KNEE | Status: FUNCTIONAL
Brand: ATTUNE

## 2024-05-29 DEVICE — ATTUNE KNEE SYSTEM FEMORAL CRUCIATE RETAINING NARROW SIZE 5N RIGHT CEMENTED
Type: IMPLANTABLE DEVICE | Site: KNEE | Status: FUNCTIONAL
Brand: ATTUNE

## 2024-05-29 RX ORDER — LOSARTAN POTASSIUM 50 MG/1
100 TABLET ORAL DAILY
Status: DISCONTINUED | OUTPATIENT
Start: 2024-05-29 | End: 2024-05-30 | Stop reason: HOSPADM

## 2024-05-29 RX ORDER — OXYCODONE HYDROCHLORIDE 5 MG/1
2.5 TABLET ORAL EVERY 4 HOURS PRN
Status: DISCONTINUED | OUTPATIENT
Start: 2024-05-29 | End: 2024-05-29

## 2024-05-29 RX ORDER — ATORVASTATIN CALCIUM 40 MG/1
40 TABLET, FILM COATED ORAL DAILY
Status: DISCONTINUED | OUTPATIENT
Start: 2024-05-29 | End: 2024-05-30 | Stop reason: HOSPADM

## 2024-05-29 RX ORDER — DIPHENHYDRAMINE HYDROCHLORIDE 50 MG/ML
12.5 INJECTION INTRAMUSCULAR; INTRAVENOUS ONCE AS NEEDED
Status: DISCONTINUED | OUTPATIENT
Start: 2024-05-29 | End: 2024-05-29 | Stop reason: HOSPADM

## 2024-05-29 RX ORDER — SODIUM CHLORIDE, SODIUM LACTATE, POTASSIUM CHLORIDE, CALCIUM CHLORIDE 600; 310; 30; 20 MG/100ML; MG/100ML; MG/100ML; MG/100ML
100 INJECTION, SOLUTION INTRAVENOUS CONTINUOUS
Status: DISCONTINUED | OUTPATIENT
Start: 2024-05-29 | End: 2024-05-29 | Stop reason: HOSPADM

## 2024-05-29 RX ORDER — HYDROMORPHONE HYDROCHLORIDE 1 MG/ML
1 INJECTION, SOLUTION INTRAMUSCULAR; INTRAVENOUS; SUBCUTANEOUS EVERY 5 MIN PRN
Status: DISCONTINUED | OUTPATIENT
Start: 2024-05-29 | End: 2024-05-29 | Stop reason: HOSPADM

## 2024-05-29 RX ORDER — ACETAMINOPHEN 325 MG/1
650 TABLET ORAL EVERY 6 HOURS SCHEDULED
Start: 2024-05-29

## 2024-05-29 RX ORDER — KETOROLAC TROMETHAMINE 30 MG/ML
15 INJECTION, SOLUTION INTRAMUSCULAR; INTRAVENOUS EVERY 8 HOURS
Status: COMPLETED | OUTPATIENT
Start: 2024-05-29 | End: 2024-05-30

## 2024-05-29 RX ORDER — HYDRALAZINE HYDROCHLORIDE 20 MG/ML
5 INJECTION INTRAMUSCULAR; INTRAVENOUS EVERY 30 MIN PRN
Status: DISCONTINUED | OUTPATIENT
Start: 2024-05-29 | End: 2024-05-29 | Stop reason: HOSPADM

## 2024-05-29 RX ORDER — GABAPENTIN 300 MG/1
300 CAPSULE ORAL ONCE
Status: COMPLETED | OUTPATIENT
Start: 2024-05-29 | End: 2024-05-29

## 2024-05-29 RX ORDER — CEFAZOLIN SODIUM 2 G/100ML
2 INJECTION, SOLUTION INTRAVENOUS ONCE
Status: COMPLETED | OUTPATIENT
Start: 2024-05-29 | End: 2024-05-29

## 2024-05-29 RX ORDER — NALOXONE HYDROCHLORIDE 1 MG/ML
0.2 INJECTION INTRAMUSCULAR; INTRAVENOUS; SUBCUTANEOUS EVERY 5 MIN PRN
Status: DISCONTINUED | OUTPATIENT
Start: 2024-05-29 | End: 2024-05-30 | Stop reason: HOSPADM

## 2024-05-29 RX ORDER — LORATADINE 10 MG/1
10 TABLET ORAL DAILY
Status: DISCONTINUED | OUTPATIENT
Start: 2024-05-29 | End: 2024-05-30 | Stop reason: HOSPADM

## 2024-05-29 RX ORDER — OXYCODONE HYDROCHLORIDE 5 MG/1
5 TABLET ORAL EVERY 6 HOURS PRN
Status: DISCONTINUED | OUTPATIENT
Start: 2024-05-29 | End: 2024-05-29

## 2024-05-29 RX ORDER — HYDROCODONE BITARTRATE AND ACETAMINOPHEN 5; 325 MG/1; MG/1
1 TABLET ORAL EVERY 6 HOURS PRN
Status: DISCONTINUED | OUTPATIENT
Start: 2024-05-29 | End: 2024-05-30 | Stop reason: HOSPADM

## 2024-05-29 RX ORDER — ACETAMINOPHEN 325 MG/1
650 TABLET ORAL EVERY 4 HOURS PRN
Status: DISCONTINUED | OUTPATIENT
Start: 2024-05-29 | End: 2024-05-29 | Stop reason: HOSPADM

## 2024-05-29 RX ORDER — CYCLOBENZAPRINE HCL 10 MG
10 TABLET ORAL 3 TIMES DAILY PRN
Status: DISCONTINUED | OUTPATIENT
Start: 2024-05-29 | End: 2024-05-30 | Stop reason: HOSPADM

## 2024-05-29 RX ORDER — POLYETHYLENE GLYCOL 3350 17 G/17G
17 POWDER, FOR SOLUTION ORAL DAILY
Status: DISCONTINUED | OUTPATIENT
Start: 2024-05-29 | End: 2024-05-30 | Stop reason: HOSPADM

## 2024-05-29 RX ORDER — OXYCODONE HYDROCHLORIDE 5 MG/1
10 TABLET ORAL EVERY 4 HOURS PRN
Status: DISCONTINUED | OUTPATIENT
Start: 2024-05-29 | End: 2024-05-29

## 2024-05-29 RX ORDER — FENTANYL CITRATE 50 UG/ML
INJECTION, SOLUTION INTRAMUSCULAR; INTRAVENOUS AS NEEDED
Status: DISCONTINUED | OUTPATIENT
Start: 2024-05-29 | End: 2024-05-29

## 2024-05-29 RX ORDER — PROMETHAZINE HYDROCHLORIDE 25 MG/1
25 TABLET ORAL EVERY 6 HOURS PRN
Status: DISCONTINUED | OUTPATIENT
Start: 2024-05-29 | End: 2024-05-30 | Stop reason: HOSPADM

## 2024-05-29 RX ORDER — KETOROLAC TROMETHAMINE 30 MG/ML
15 INJECTION, SOLUTION INTRAMUSCULAR; INTRAVENOUS EVERY 6 HOURS PRN
Status: DISCONTINUED | OUTPATIENT
Start: 2024-05-29 | End: 2024-05-30 | Stop reason: HOSPADM

## 2024-05-29 RX ORDER — CELECOXIB 100 MG/1
100 CAPSULE ORAL ONCE
Status: COMPLETED | OUTPATIENT
Start: 2024-05-29 | End: 2024-05-29

## 2024-05-29 RX ORDER — TRAMADOL HYDROCHLORIDE 50 MG/1
50 TABLET ORAL EVERY 6 HOURS PRN
Qty: 28 TABLET | Refills: 0 | Status: SHIPPED | OUTPATIENT
Start: 2024-05-29 | End: 2024-06-03 | Stop reason: SDUPTHER

## 2024-05-29 RX ORDER — ONDANSETRON HYDROCHLORIDE 2 MG/ML
INJECTION, SOLUTION INTRAVENOUS AS NEEDED
Status: DISCONTINUED | OUTPATIENT
Start: 2024-05-29 | End: 2024-05-29

## 2024-05-29 RX ORDER — LABETALOL HYDROCHLORIDE 5 MG/ML
5 INJECTION, SOLUTION INTRAVENOUS ONCE AS NEEDED
Status: DISCONTINUED | OUTPATIENT
Start: 2024-05-29 | End: 2024-05-29 | Stop reason: HOSPADM

## 2024-05-29 RX ORDER — SODIUM CHLORIDE 0.9 G/100ML
IRRIGANT IRRIGATION AS NEEDED
Status: DISCONTINUED | OUTPATIENT
Start: 2024-05-29 | End: 2024-05-29 | Stop reason: HOSPADM

## 2024-05-29 RX ORDER — CITALOPRAM 20 MG/1
20 TABLET, FILM COATED ORAL DAILY
Status: DISCONTINUED | OUTPATIENT
Start: 2024-05-29 | End: 2024-05-30 | Stop reason: HOSPADM

## 2024-05-29 RX ORDER — ONDANSETRON 4 MG/1
4 TABLET, FILM COATED ORAL EVERY 8 HOURS PRN
Status: DISCONTINUED | OUTPATIENT
Start: 2024-05-29 | End: 2024-05-30 | Stop reason: HOSPADM

## 2024-05-29 RX ORDER — MIDAZOLAM HYDROCHLORIDE 1 MG/ML
INJECTION, SOLUTION INTRAMUSCULAR; INTRAVENOUS
Status: COMPLETED
Start: 2024-05-29 | End: 2024-05-29

## 2024-05-29 RX ORDER — POLYETHYLENE GLYCOL 3350 17 G/17G
17 POWDER, FOR SOLUTION ORAL DAILY PRN
Start: 2024-05-29

## 2024-05-29 RX ORDER — FENTANYL CITRATE 50 UG/ML
INJECTION, SOLUTION INTRAMUSCULAR; INTRAVENOUS
Status: COMPLETED
Start: 2024-05-29 | End: 2024-05-29

## 2024-05-29 RX ORDER — OXYCODONE HYDROCHLORIDE 5 MG/1
5-10 TABLET ORAL EVERY 6 HOURS PRN
Qty: 28 TABLET | Refills: 0 | Status: SHIPPED | OUTPATIENT
Start: 2024-05-29 | End: 2024-06-03 | Stop reason: SDUPTHER

## 2024-05-29 RX ORDER — LIDOCAINE HCL/PF 100 MG/5ML
SYRINGE (ML) INTRAVENOUS AS NEEDED
Status: DISCONTINUED | OUTPATIENT
Start: 2024-05-29 | End: 2024-05-29

## 2024-05-29 RX ORDER — DIPHENHYDRAMINE HCL 12.5MG/5ML
12.5 LIQUID (ML) ORAL EVERY 6 HOURS PRN
Status: DISCONTINUED | OUTPATIENT
Start: 2024-05-29 | End: 2024-05-30 | Stop reason: HOSPADM

## 2024-05-29 RX ORDER — ACETAMINOPHEN 325 MG/1
650 TABLET ORAL ONCE
Status: COMPLETED | OUTPATIENT
Start: 2024-05-29 | End: 2024-05-29

## 2024-05-29 RX ORDER — TRANEXAMIC ACID 10 MG/ML
1000 INJECTION, SOLUTION INTRAVENOUS
Status: COMPLETED | OUTPATIENT
Start: 2024-05-29 | End: 2024-05-29

## 2024-05-29 RX ORDER — METOPROLOL TARTRATE 1 MG/ML
INJECTION, SOLUTION INTRAVENOUS AS NEEDED
Status: DISCONTINUED | OUTPATIENT
Start: 2024-05-29 | End: 2024-05-29

## 2024-05-29 RX ORDER — TRAMADOL HYDROCHLORIDE 50 MG/1
50 TABLET ORAL EVERY 6 HOURS
Status: DISCONTINUED | OUTPATIENT
Start: 2024-05-29 | End: 2024-05-30 | Stop reason: HOSPADM

## 2024-05-29 RX ORDER — PROMETHAZINE HYDROCHLORIDE 25 MG/1
25 SUPPOSITORY RECTAL EVERY 12 HOURS PRN
Status: DISCONTINUED | OUTPATIENT
Start: 2024-05-29 | End: 2024-05-30 | Stop reason: HOSPADM

## 2024-05-29 RX ORDER — SODIUM CHLORIDE, SODIUM LACTATE, POTASSIUM CHLORIDE, CALCIUM CHLORIDE 600; 310; 30; 20 MG/100ML; MG/100ML; MG/100ML; MG/100ML
100 INJECTION, SOLUTION INTRAVENOUS CONTINUOUS
Status: DISCONTINUED | OUTPATIENT
Start: 2024-05-29 | End: 2024-05-30 | Stop reason: HOSPADM

## 2024-05-29 RX ORDER — ACETAMINOPHEN 325 MG/1
650 TABLET ORAL EVERY 6 HOURS SCHEDULED
Status: DISCONTINUED | OUTPATIENT
Start: 2024-05-29 | End: 2024-05-30 | Stop reason: HOSPADM

## 2024-05-29 RX ORDER — HYDROCODONE BITARTRATE AND ACETAMINOPHEN 5; 325 MG/1; MG/1
2 TABLET ORAL EVERY 6 HOURS PRN
Status: DISCONTINUED | OUTPATIENT
Start: 2024-05-29 | End: 2024-05-30 | Stop reason: HOSPADM

## 2024-05-29 RX ORDER — METOPROLOL TARTRATE 25 MG/1
25 TABLET, FILM COATED ORAL 2 TIMES DAILY
Status: DISCONTINUED | OUTPATIENT
Start: 2024-05-29 | End: 2024-05-30 | Stop reason: HOSPADM

## 2024-05-29 RX ORDER — DOCUSATE SODIUM 100 MG/1
100 CAPSULE, LIQUID FILLED ORAL 2 TIMES DAILY
Status: DISCONTINUED | OUTPATIENT
Start: 2024-05-29 | End: 2024-05-30 | Stop reason: HOSPADM

## 2024-05-29 RX ORDER — MEPERIDINE HYDROCHLORIDE 25 MG/ML
12.5 INJECTION INTRAMUSCULAR; INTRAVENOUS; SUBCUTANEOUS EVERY 10 MIN PRN
Status: DISCONTINUED | OUTPATIENT
Start: 2024-05-29 | End: 2024-05-29 | Stop reason: HOSPADM

## 2024-05-29 RX ORDER — CEFAZOLIN SODIUM 2 G/100ML
2 INJECTION, SOLUTION INTRAVENOUS EVERY 8 HOURS
Status: COMPLETED | OUTPATIENT
Start: 2024-05-29 | End: 2024-05-30

## 2024-05-29 RX ORDER — ONDANSETRON HYDROCHLORIDE 2 MG/ML
4 INJECTION, SOLUTION INTRAVENOUS ONCE AS NEEDED
Status: DISCONTINUED | OUTPATIENT
Start: 2024-05-29 | End: 2024-05-29 | Stop reason: HOSPADM

## 2024-05-29 RX ORDER — ONDANSETRON HYDROCHLORIDE 2 MG/ML
4 INJECTION, SOLUTION INTRAVENOUS EVERY 8 HOURS PRN
Status: DISCONTINUED | OUTPATIENT
Start: 2024-05-29 | End: 2024-05-30 | Stop reason: HOSPADM

## 2024-05-29 RX ORDER — PROPOFOL 10 MG/ML
INJECTION, EMULSION INTRAVENOUS CONTINUOUS PRN
Status: DISCONTINUED | OUTPATIENT
Start: 2024-05-29 | End: 2024-05-29

## 2024-05-29 RX ORDER — MIDAZOLAM HYDROCHLORIDE 1 MG/ML
INJECTION, SOLUTION INTRAMUSCULAR; INTRAVENOUS AS NEEDED
Status: DISCONTINUED | OUTPATIENT
Start: 2024-05-29 | End: 2024-05-29

## 2024-05-29 RX ADMIN — SODIUM CHLORIDE, POTASSIUM CHLORIDE, SODIUM LACTATE AND CALCIUM CHLORIDE 100 ML/HR: 600; 310; 30; 20 INJECTION, SOLUTION INTRAVENOUS at 18:40

## 2024-05-29 RX ADMIN — LIDOCAINE HYDROCHLORIDE 30 MG: 20 INJECTION, SOLUTION INTRAVENOUS at 13:39

## 2024-05-29 RX ADMIN — ACETAMINOPHEN 650 MG: 325 TABLET ORAL at 23:09

## 2024-05-29 RX ADMIN — PROPOFOL 100 MCG/KG/MIN: 10 INJECTION, EMULSION INTRAVENOUS at 13:39

## 2024-05-29 RX ADMIN — POLYETHYLENE GLYCOL 3350 17 G: 17 POWDER, FOR SOLUTION ORAL at 18:38

## 2024-05-29 RX ADMIN — GABAPENTIN 300 MG: 300 CAPSULE ORAL at 12:19

## 2024-05-29 RX ADMIN — MIDAZOLAM 2 MG: 1 INJECTION INTRAMUSCULAR; INTRAVENOUS at 12:21

## 2024-05-29 RX ADMIN — FENTANYL CITRATE 100 MCG: 50 INJECTION, SOLUTION INTRAMUSCULAR; INTRAVENOUS at 12:21

## 2024-05-29 RX ADMIN — SODIUM CHLORIDE, SODIUM LACTATE, POTASSIUM CHLORIDE, AND CALCIUM CHLORIDE: .6; .31; .03; .02 INJECTION, SOLUTION INTRAVENOUS at 15:36

## 2024-05-29 RX ADMIN — TRAMADOL HYDROCHLORIDE 50 MG: 50 TABLET, COATED ORAL at 18:38

## 2024-05-29 RX ADMIN — TRANEXAMIC ACID 1000 MG: 10 INJECTION, SOLUTION INTRAVENOUS at 13:50

## 2024-05-29 RX ADMIN — CELECOXIB 100 MG: 100 CAPSULE ORAL at 12:19

## 2024-05-29 RX ADMIN — SODIUM CHLORIDE, POTASSIUM CHLORIDE, SODIUM LACTATE AND CALCIUM CHLORIDE 100 ML/HR: 600; 310; 30; 20 INJECTION, SOLUTION INTRAVENOUS at 12:20

## 2024-05-29 RX ADMIN — PROPOFOL 20 MG: 10 INJECTION, EMULSION INTRAVENOUS at 13:40

## 2024-05-29 RX ADMIN — ACETAMINOPHEN 650 MG: 325 TABLET ORAL at 12:19

## 2024-05-29 RX ADMIN — KETOROLAC TROMETHAMINE 15 MG: 30 INJECTION, SOLUTION INTRAMUSCULAR; INTRAVENOUS at 22:00

## 2024-05-29 RX ADMIN — METOPROLOL TARTRATE 25 MG: 25 TABLET, FILM COATED ORAL at 21:00

## 2024-05-29 RX ADMIN — ONDANSETRON 4 MG: 2 INJECTION, SOLUTION INTRAMUSCULAR; INTRAVENOUS at 15:11

## 2024-05-29 RX ADMIN — HYDROCODONE BITARTRATE AND ACETAMINOPHEN 2 TABLET: 5; 325 TABLET ORAL at 21:16

## 2024-05-29 RX ADMIN — DOCUSATE SODIUM 100 MG: 100 CAPSULE, LIQUID FILLED ORAL at 21:00

## 2024-05-29 RX ADMIN — MIDAZOLAM 2 MG: 1 INJECTION INTRAMUSCULAR; INTRAVENOUS at 13:30

## 2024-05-29 RX ADMIN — ACETAMINOPHEN 650 MG: 325 TABLET ORAL at 18:38

## 2024-05-29 RX ADMIN — CEFAZOLIN SODIUM 2 G: 2 INJECTION, SOLUTION INTRAVENOUS at 21:21

## 2024-05-29 RX ADMIN — TRAMADOL HYDROCHLORIDE 50 MG: 50 TABLET, COATED ORAL at 23:11

## 2024-05-29 RX ADMIN — POVIDONE-IODINE 1 APPLICATION: 5 SOLUTION TOPICAL at 12:19

## 2024-05-29 RX ADMIN — CEFAZOLIN SODIUM 2 G: 2 INJECTION, SOLUTION INTRAVENOUS at 13:44

## 2024-05-29 RX ADMIN — TRANEXAMIC ACID 1000 MG: 10 INJECTION, SOLUTION INTRAVENOUS at 15:06

## 2024-05-29 RX ADMIN — METOPROLOL TARTRATE 3 MG: 5 INJECTION INTRAVENOUS at 15:18

## 2024-05-29 RX ADMIN — SODIUM CHLORIDE, SODIUM LACTATE, POTASSIUM CHLORIDE, AND CALCIUM CHLORIDE: .6; .31; .03; .02 INJECTION, SOLUTION INTRAVENOUS at 13:00

## 2024-05-29 SDOH — SOCIAL STABILITY: SOCIAL INSECURITY: DO YOU FEEL ANYONE HAS EXPLOITED OR TAKEN ADVANTAGE OF YOU FINANCIALLY OR OF YOUR PERSONAL PROPERTY?: NO

## 2024-05-29 SDOH — SOCIAL STABILITY: SOCIAL INSECURITY: DOES ANYONE TRY TO KEEP YOU FROM HAVING/CONTACTING OTHER FRIENDS OR DOING THINGS OUTSIDE YOUR HOME?: NO

## 2024-05-29 SDOH — SOCIAL STABILITY: SOCIAL INSECURITY: ARE YOU OR HAVE YOU BEEN THREATENED OR ABUSED PHYSICALLY, EMOTIONALLY, OR SEXUALLY BY ANYONE?: NO

## 2024-05-29 SDOH — SOCIAL STABILITY: SOCIAL INSECURITY: HAVE YOU HAD ANY THOUGHTS OF HARMING ANYONE ELSE?: NO

## 2024-05-29 SDOH — SOCIAL STABILITY: SOCIAL INSECURITY: HAS ANYONE EVER THREATENED TO HURT YOUR FAMILY OR YOUR PETS?: NO

## 2024-05-29 SDOH — SOCIAL STABILITY: SOCIAL INSECURITY: DO YOU FEEL UNSAFE GOING BACK TO THE PLACE WHERE YOU ARE LIVING?: NO

## 2024-05-29 SDOH — SOCIAL STABILITY: SOCIAL INSECURITY: ARE THERE ANY APPARENT SIGNS OF INJURIES/BEHAVIORS THAT COULD BE RELATED TO ABUSE/NEGLECT?: NO

## 2024-05-29 SDOH — SOCIAL STABILITY: SOCIAL INSECURITY: HAVE YOU HAD THOUGHTS OF HARMING ANYONE ELSE?: NO

## 2024-05-29 SDOH — HEALTH STABILITY: MENTAL HEALTH: CURRENT SMOKER: 0

## 2024-05-29 SDOH — SOCIAL STABILITY: SOCIAL INSECURITY: ABUSE: ADULT

## 2024-05-29 ASSESSMENT — ACTIVITIES OF DAILY LIVING (ADL)
JUDGMENT_ADEQUATE_SAFELY_COMPLETE_DAILY_ACTIVITIES: YES
WALKS IN HOME: NEEDS ASSISTANCE
ADEQUATE_TO_COMPLETE_ADL: YES
PATIENT'S MEMORY ADEQUATE TO SAFELY COMPLETE DAILY ACTIVITIES?: YES
HEARING - LEFT EAR: FUNCTIONAL
FEEDING YOURSELF: INDEPENDENT
BATHING: NEEDS ASSISTANCE
HEARING - RIGHT EAR: FUNCTIONAL
LACK_OF_TRANSPORTATION: NO
DRESSING YOURSELF: NEEDS ASSISTANCE
TOILETING: NEEDS ASSISTANCE
GROOMING: INDEPENDENT

## 2024-05-29 ASSESSMENT — PAIN SCALES - GENERAL
PAINLEVEL_OUTOF10: 7
PAINLEVEL_OUTOF10: 10 - WORST POSSIBLE PAIN
PAINLEVEL_OUTOF10: 0 - NO PAIN

## 2024-05-29 ASSESSMENT — COGNITIVE AND FUNCTIONAL STATUS - GENERAL
PATIENT BASELINE BEDBOUND: NO
DRESSING REGULAR LOWER BODY CLOTHING: A LITTLE
PERSONAL GROOMING: A LITTLE
MOVING FROM LYING ON BACK TO SITTING ON SIDE OF FLAT BED WITH BEDRAILS: A LITTLE
TOILETING: A LITTLE
CLIMB 3 TO 5 STEPS WITH RAILING: A LITTLE
WALKING IN HOSPITAL ROOM: A LITTLE
MOBILITY SCORE: 18
HELP NEEDED FOR BATHING: A LITTLE
TURNING FROM BACK TO SIDE WHILE IN FLAT BAD: A LITTLE
DAILY ACTIVITIY SCORE: 19
DRESSING REGULAR UPPER BODY CLOTHING: A LITTLE
STANDING UP FROM CHAIR USING ARMS: A LITTLE
MOVING TO AND FROM BED TO CHAIR: A LITTLE

## 2024-05-29 ASSESSMENT — LIFESTYLE VARIABLES
SUBSTANCE_ABUSE_PAST_12_MONTHS: NO
HOW MANY STANDARD DRINKS CONTAINING ALCOHOL DO YOU HAVE ON A TYPICAL DAY: PATIENT DOES NOT DRINK
AUDIT-C TOTAL SCORE: 0
HOW OFTEN DO YOU HAVE A DRINK CONTAINING ALCOHOL: NEVER
HOW OFTEN DO YOU HAVE 6 OR MORE DRINKS ON ONE OCCASION: NEVER
AUDIT-C TOTAL SCORE: 0
SKIP TO QUESTIONS 9-10: 1
PRESCIPTION_ABUSE_PAST_12_MONTHS: NO

## 2024-05-29 ASSESSMENT — COLUMBIA-SUICIDE SEVERITY RATING SCALE - C-SSRS
6. HAVE YOU EVER DONE ANYTHING, STARTED TO DO ANYTHING, OR PREPARED TO DO ANYTHING TO END YOUR LIFE?: NO
1. IN THE PAST MONTH, HAVE YOU WISHED YOU WERE DEAD OR WISHED YOU COULD GO TO SLEEP AND NOT WAKE UP?: NO
2. HAVE YOU ACTUALLY HAD ANY THOUGHTS OF KILLING YOURSELF?: NO

## 2024-05-29 ASSESSMENT — PATIENT HEALTH QUESTIONNAIRE - PHQ9
1. LITTLE INTEREST OR PLEASURE IN DOING THINGS: NOT AT ALL
2. FEELING DOWN, DEPRESSED OR HOPELESS: NOT AT ALL
SUM OF ALL RESPONSES TO PHQ9 QUESTIONS 1 & 2: 0

## 2024-05-29 ASSESSMENT — PAIN - FUNCTIONAL ASSESSMENT
PAIN_FUNCTIONAL_ASSESSMENT: 0-10
PAIN_FUNCTIONAL_ASSESSMENT: 0-10

## 2024-05-29 NOTE — ANESTHESIA PROCEDURE NOTES
Spinal Block    Start time: 5/29/2024 1:31 PM  End time: 5/29/2024 1:40 PM  Reason for block: primary anesthetic  Staffing  Performed: SRNA   Authorized by: Ruddy Kirby MD    Performed by: BIA Waite    Preanesthetic Checklist  Completed: patient identified, IV checked, risks and benefits discussed, surgical consent, pre-op evaluation, timeout performed and sterile techniques followed  Block Timeout  RN/Licensed healthcare professional reads aloud to the Anesthesia provider and entire team: Patient identity, procedure with side and site, patient position, and as applicable the availability of implants/special equipment/special requirements.  Patient on coagulant treatment: no  Timeout performed at: 5/29/2024 1:29 PM  Spinal Block  Patient position: sitting  Prep: Betadine  Sterility prep: gloves, hand hygiene, mask, cap and drape  Sedation level: moderate sedation  Patient monitoring: blood pressure, continuous pulse oximetry, EKG and heart rate  Approach: midline  Vertebral space: L4-5  Injection technique: single-shot  Needle  Needle type: Tracy   Needle gauge: 22 G  Needle length: 3.5 in      Assessment  Sensory level: T10 bilateral  Procedure assessment: patient sedated but conversant throughout procedure  Additional Notes  12 mg hyperbaric marcaine with 1:[id number] epi injected slowly.

## 2024-05-29 NOTE — ANESTHESIA PREPROCEDURE EVALUATION
Patient: Thelma Orosco    Procedure Information       Date/Time: 05/29/24 1330    Procedure: RIGHT TOTAL KNEE REPLACEMENT (Right: Knee)    Location: [facility] / [facility]    Surgeons: Hector Miranda MD            Relevant Problems   Anesthesia   (+) PONV (postoperative nausea and vomiting)      Cardiac   (+) CAD (coronary artery disease)   (+) Hyperlipidemia   (+) Primary hypertension   (+) Stented coronary artery       Clinical information reviewed:   Tobacco  Allergies  Meds   Med Hx  Surg Hx   Fam Hx          NPO Detail:  NPO/Void Status  Carbohydrate Drink Given Prior to Surgery? : N  Date of Last Liquid: 05/29/24  Time of Last Liquid: 0900  Date of Last Solid: 05/28/24  Time of Last Solid: 2130  Last Intake Type: Clear fluids         Physical Exam    Airway  Mallampati: II  TM distance: >3 FB  Neck ROM: full     Cardiovascular - normal exam  Rhythm: regular  Rate: normal     Dental - normal exam     Pulmonary - normal exam     Abdominal            Anesthesia Plan    History of general anesthesia?: yes  History of complications of general anesthesia?: yes    ASA 3     spinal and regional     The patient is not a current smoker.    intravenous induction   Postoperative administration of opioids is intended.  Anesthetic plan and risks discussed with patient.  Use of blood products discussed with patient who consented to blood products.    Plan discussed with CAA.

## 2024-05-29 NOTE — ANESTHESIA PROCEDURE NOTES
Peripheral Block    Patient location during procedure: pre-op  Start time: 5/29/2024 12:21 PM  End time: 5/29/2024 12:33 PM  Reason for block: at surgeon's request and post-op pain management  Staffing  Performed: attending   Authorized by: Ruddy Kirby MD    Performed by: Ruddy Kirby MD  Preanesthetic Checklist  Completed: patient identified, IV checked, site marked, risks and benefits discussed, surgical consent, monitors and equipment checked, pre-op evaluation and timeout performed   Timeout performed at: 5/29/2024 12:21 PM  Peripheral Block  Patient position: laying flat  Prep: ChloraPrep  Patient monitoring: heart rate, cardiac monitor and continuous pulse ox  Block type: adductor canal  Injection technique: single-shot  Guidance: ultrasound guided  Local infiltration: ropivacaine  Infiltration strength: 0.5 %  Dose: 30 mL  Needle  Needle gauge: 20 G  Needle localization: ultrasound guidance  Test dose: negative  Assessment  Injection assessment: negative aspiration for heme, no paresthesia on injection, incremental injection and local visualized surrounding nerve on ultrasound  Paresthesia pain: none  Heart rate change: no  Slow fractionated injection: yes

## 2024-05-29 NOTE — ANESTHESIA POSTPROCEDURE EVALUATION
Patient: Thelma Orosco    Procedure Summary       Date: 05/29/24 Room / Location: [facility] / [facility]    Anesthesia Start: 1329 Anesthesia Stop: 1552    Procedure: RIGHT TOTAL KNEE REPLACEMENT (Right: Knee) Diagnosis:       Arthritis of knee, right      (Arthritis of knee, right [M17.11])    Surgeons: Hector Miranda MD Responsible Provider: Ruddy Kirby MD    Anesthesia Type: spinal, regional ASA Status: 3            Anesthesia Type: spinal, regional    Vitals Value Taken Time   /71 05/29/24 1645   Temp 37 °C (98.6 °F) 05/29/24 1551   Pulse 73 05/29/24 1655   Resp 31 05/29/24 1655   SpO2 98 % 05/29/24 1655   Vitals shown include unfiled device data.    Anesthesia Post Evaluation    Patient location during evaluation: PACU  Patient participation: complete - patient participated  Level of consciousness: awake and alert  Pain management: adequate  Multimodal analgesia pain management approach  Airway patency: patent  Cardiovascular status: acceptable  Respiratory status: acceptable  Hydration status: acceptable  Postoperative Nausea and Vomiting: none        No notable events documented.

## 2024-05-29 NOTE — CONSULTS
"Inpatient consult to Medicine  Consult performed by: ALESHIA Wick-CNP  Consult ordered by: Hector Miranda MD  Reason for consult: medical management      History Of Present Illness  Thelma Orosco is a 68 y.o. female with hx of osteoarthritis who is POD #0 right total knee replacement. At the bedside she denies pain; nausea has resolved after eating dinner. She says in the past she saw \"spots coming at me\" after taking codeine and morphine. She does tolerate Vicodin and says her pain threshold is high.  at bedside. Remainder of ROS reviewed and negative except as indicated in HPI.     Objective  Past Medical History  She has a past medical history of Coronary artery disease, Depression, Hyperlipidemia, Hypertension, and Myocardial infarction (Multi).    Surgical History  She has a past surgical history that includes Coronary stent placement (2015) and Knee Arthroplasty (Right, 05/29/2024).    Social History     Tobacco Use    Smoking status: Former     Types: Cigarettes    Smokeless tobacco: Never   Substance Use Topics    Alcohol use: Yes    Drug use: Never       No family history on file.    Codeine, Lisinopril, and Morphine    Vitals:    05/29/24 1709   BP: 149/77   Pulse:    Resp: 18   Temp: 35.6 °C (96.1 °F)   SpO2: 99%       Vitals:    05/29/24 1203   Weight: 86.6 kg (190 lb 14.7 oz)       Scheduled medications  acetaminophen, 650 mg, oral, q6h JHOANA  atorvastatin, 40 mg, oral, Daily  ceFAZolin, 2 g, intravenous, q8h  citalopram, 20 mg, oral, Daily  docusate sodium, 100 mg, oral, BID  loratadine, 10 mg, oral, Daily  losartan, 100 mg, oral, Daily  metoprolol tartrate, 25 mg, oral, BID  polyethylene glycol, 17 g, oral, Daily  [START ON 5/30/2024] rivaroxaban, 10 mg, oral, Daily      Continuous medications  lactated Ringer's, 100 mL/hr, Last Rate: 100 mL/hr (05/29/24 1718)  lactated Ringer's, 100 mL/hr  oxygen, 2 L/min      PRN medications  PRN medications: cyclobenzaprine, diphenhydrAMINE, " HYDROmorphone, HYDROmorphone, ketorolac, naloxone, ondansetron **OR** ondansetron, oxyCODONE, oxyCODONE, oxyCODONE, promethazine **OR** promethazine    No results found for this or any previous visit (from the past 24 hour(s)).    I personally reviewed all pertinent labwork, imaging and vital signs, as well as medications, nursing, therapy, discharge planning and consult notes.     Constitutional: Well developed, awake, alert, calm, oriented x4, no acute distress, cooperative   Eyes: EOMI, clear sclera   ENMT: mucous membranes moist, no lesions seen   Head/Neck: Neck supple, no apparent injury, head atraumatic   Respiratory/Thorax: CTAB, good chest expansion, respirations even and unlabored   Cardiovascular: Regular rate and rhythm, no murmurs/rubs/gallops, normal S1 and S 2   Gastrointestinal: Abdomen nondistended, soft, nontender, +BS, no bruits   Musculoskeletal: ROM intact, no joint swelling, normal  strength, RLE deferred   Extremities: no cyanosis, edema, contusions or clubbing, ice pack on R knee   Neurological: no focal deficit, pt alert and oriented x4   Psychological: Appropriate affect and behavior, pleasant   Skin: Warm and dry, no lesions, no rashes       Assessment/Plan  Osteoarthritis POD #0 right total knee replacement     - management per primary surgery service      - continue pain and bowel regimen, pt advised to stay ahead of pain     - will switch Dilaudid and oxycodone to Vicodin due to hx of VH but she tolerates Vicodin  CAD s/p MI and PCI in 2015     - continue Lipitor and metoprolol, BASA held perioperatively  HTN     - BP controlled on metoprolol and losartan  Depression, continue Celexa  DVT ppx     - low-dose Xarelto per surgery protocol      Yue Simpson, CNP  Hospital Medicine

## 2024-05-29 NOTE — OP NOTE
RIGHT TOTAL KNEE REPLACEMENT (R) Operative Note     Date: 2024  OR Location: [facility]    Name: Thelma Orosco, : 1955, Age: 68 y.o., MRN: [id number], Sex: female    Diagnosis  Pre-op Diagnosis     * Arthritis of knee, right [M17.11] Post-op Diagnosis     * Arthritis of knee, right [M17.11]     Procedures  RIGHT TOTAL KNEE REPLACEMENT  02224 - TX ARTHRP KNE CONDYLE&PLATU MEDIAL&LAT COMPARTMENTS  DePuy attune femur 5 narrow, tibia 3, 7 mm polyethylene, 32 mm patella    Surgeons      * Hector Miranda - Primary    Resident/Fellow/Other Assistant:    Marquise Smith    Procedure Summary  Anesthesia: Consult  ASA: III  Anesthesia Staff: Anesthesiologist: Ruddy Kirby MD  C-AA: BIA Waite  Estimated Blood Loss: 25 mL    Indications:  Patient has undergone extensive conservative treatment, but has peristant severe sharp shooting pain and difficulty with standing and walking.  Radiographs demonstrate severe degenerative changes with loss of joint space and osteophyte formation and subchondral sclerosis and cystic change.  Treatment options including no treatment were reviewed and the decision was made to proceed with surgery.    Description of procedure: The patient was brought into the operating room.  Anesthesia was performed.  The patient was positioned and prepped and draped in the usual fashion.  After Esmarch exsanguination the tourniquet was inflated.  A longitudinal midline incision was made.  Medial parapatellar arthrotomy was performed.  Medial periosteal release was done.  There was advanced degenerative disease involving the knee.  Remaining medial and lateral menisci were removed.  ACL was removed.  Notch osteophytes were removed.  Entry into the femoral canal was done and the intramedullary guide was placed.  The distal femoral cut was made.  The femur was sized.  The cutting block was applied and then the cuts were made.  Peripheral osteophytes were removed.  Attention was  turned to the tibia and the external alignment guide was used and the tibial cut was made.  Posterior osteophytes were removed.  Trialing was then done and the implants were selected.  The decision was made to resurface the patella and with an oscillating saw the patellar cut was made to remove a similar amount of bone as the patellar implant.  The patellar lug drills were drilled and the patella was trialed and tracked well.  The femoral lug drills were drilled and tibia was prepared with the appropriate punches.  The bone cuts were pulse irrigated and then well dried.  The tibial implant was cemented.  The polyethylene was impacted on and then the femoral implant was cemented followed by the patellar implant.  Extruding cement was removed and the cement was allowed to harden.  The wound was copiously irrigated with antibiotic irrigation.  Hemostasis was carefully obtained.  The knee was stable throughout a full range of motion and the patella tracked well.  The arthrotomy was closed with #1 Vicryl and flexion against gravity was 120 degrees following closure.  The remainder of the wound was closed in layers and a sterile dressing was applied.  The patient tolerated the procedure well and was taken to the recovery room in stable condition.  Estimated blood loss was 25 cc.  The bone was sent for specimen.  There were no complications.  The patient received the appropriate preoperative antibiotic within 1 hour of the skin incision and antibiotics were ordered postoperatively to be completed within 24 hours.  DVT prophylaxis was ordered to start within 24 hours.  The patient received 1 g of tranexamic acid intravenous at the start and at the end of the procedure.    Intra-op Medications:   Administrations occurring from 1330 to 1600 on 05/29/24:   Medication Name Total Dose   sodium chloride 0.9 % irrigation solution 1,000 mL   ceFAZolin in dextrose (iso-os) (Ancef) IVPB 2 g 2 g   tranexamic acid in NaCl,iso-os 1,000  mg/100 mL (10 mg/mL) IV 1,000 mg 2,000 mg              Anesthesia Record               Intraprocedure I/O Totals          Intake    Tranexamic Acid 0.00 mL    The total shown is the total volume documented since Anesthesia Start was filed.    Propofol Drip 0.00 mL    The total shown is the total volume documented since Anesthesia Start was filed.    Total Intake 0 mL          Specimen:   ID Type Source Tests Collected by Time   1 : RIGHT KNEE BONE AND TISSUE Tissue KNEE ARTHROPLASTY RIGHT SURGICAL PATHOLOGY EXAM Hector Miranda MD 5/29/2024 1403        Staff:   Circulator: Viral Ledesma Person: Batool      Tourniquet Times:     Total Tourniquet Time Documented:  Thigh (Right) - 67 minutes  Total: Thigh (Right) - 67 minutes      Implants:  Implants       Type Name Action Serial No.      Joint Knee CEMENT, BONE, BIOMET R, 1X40 - [id number] Implanted      Joint Knee CEMENT, BONE, BIOMET R, 1X40 - [id number] Implanted      Joint Knee DOME, PATELLA, MEDIALIZED, 32MM - [id number] Implanted      Joint Knee FEMORAL, ATTUNE CR, AMAURI, NRW, SZ 5, RT - [id number] Implanted      Joint Knee TIBAL BASE ATTUNE FB, SZ 3 AMAURI - [id number] Implanted      Joint Knee INSERT, ATTUNE CR FB, SZ 5, 7MM - [id number] Implanted               Attending Attestation: I performed the procedure.    Hector Miranda  Phone Number: [phone]

## 2024-05-30 ENCOUNTER — PHARMACY VISIT (OUTPATIENT)
Dept: PHARMACY | Facility: CLINIC | Age: 69
End: 2024-05-30
Payer: COMMERCIAL

## 2024-05-30 ENCOUNTER — HOME HEALTH ADMISSION (OUTPATIENT)
Dept: HOME HEALTH SERVICES | Facility: HOME HEALTH | Age: 69
End: 2024-05-30
Payer: MEDICARE

## 2024-05-30 ENCOUNTER — DOCUMENTATION (OUTPATIENT)
Dept: HOME HEALTH SERVICES | Facility: HOME HEALTH | Age: 69
End: 2024-05-30
Payer: MEDICARE

## 2024-05-30 VITALS
HEIGHT: 63 IN | SYSTOLIC BLOOD PRESSURE: 156 MMHG | RESPIRATION RATE: 18 BRPM | TEMPERATURE: 99.9 F | WEIGHT: 190.92 LBS | HEART RATE: 69 BPM | BODY MASS INDEX: 33.83 KG/M2 | DIASTOLIC BLOOD PRESSURE: 73 MMHG | OXYGEN SATURATION: 95 %

## 2024-05-30 LAB
ANION GAP SERPL CALC-SCNC: 11 MMOL/L (ref 10–20)
BUN SERPL-MCNC: 12 MG/DL (ref 6–23)
CALCIUM SERPL-MCNC: 8.1 MG/DL (ref 8.6–10.3)
CHLORIDE SERPL-SCNC: 101 MMOL/L (ref 98–107)
CO2 SERPL-SCNC: 26 MMOL/L (ref 21–32)
CREAT SERPL-MCNC: 0.77 MG/DL (ref 0.5–1.05)
EGFRCR SERPLBLD CKD-EPI 2021: 85 ML/MIN/1.73M*2
ERYTHROCYTE [DISTWIDTH] IN BLOOD BY AUTOMATED COUNT: 11.4 % (ref 11.5–14.5)
GLUCOSE SERPL-MCNC: 130 MG/DL (ref 74–99)
HCT VFR BLD AUTO: 32.8 % (ref 36–46)
HGB BLD-MCNC: 11.7 G/DL (ref 12–16)
MCH RBC QN AUTO: 33.1 PG (ref 26–34)
MCHC RBC AUTO-ENTMCNC: 35.7 G/DL (ref 32–36)
MCV RBC AUTO: 93 FL (ref 80–100)
NRBC BLD-RTO: 0 /100 WBCS (ref 0–0)
PLATELET # BLD AUTO: 234 X10*3/UL (ref 150–450)
POTASSIUM SERPL-SCNC: 4.5 MMOL/L (ref 3.5–5.3)
RBC # BLD AUTO: 3.54 X10*6/UL (ref 4–5.2)
SODIUM SERPL-SCNC: 133 MMOL/L (ref 136–145)
WBC # BLD AUTO: 8.9 X10*3/UL (ref 4.4–11.3)

## 2024-05-30 PROCEDURE — 7100000011 HC EXTENDED STAY RECOVERY HOURLY - NURSING UNIT

## 2024-05-30 PROCEDURE — 2500000001 HC RX 250 WO HCPCS SELF ADMINISTERED DRUGS (ALT 637 FOR MEDICARE OP): Performed by: CLINICAL NURSE SPECIALIST

## 2024-05-30 PROCEDURE — 2500000004 HC RX 250 GENERAL PHARMACY W/ HCPCS (ALT 636 FOR OP/ED): Performed by: ORTHOPAEDIC SURGERY

## 2024-05-30 PROCEDURE — 82565 ASSAY OF CREATININE: CPT | Mod: 91 | Performed by: ORTHOPAEDIC SURGERY

## 2024-05-30 PROCEDURE — 2500000002 HC RX 250 W HCPCS SELF ADMINISTERED DRUGS (ALT 637 FOR MEDICARE OP, ALT 636 FOR OP/ED): Performed by: ORTHOPAEDIC SURGERY

## 2024-05-30 PROCEDURE — 97535 SELF CARE MNGMENT TRAINING: CPT | Mod: CQ,GP

## 2024-05-30 PROCEDURE — 97165 OT EVAL LOW COMPLEX 30 MIN: CPT | Mod: GO

## 2024-05-30 PROCEDURE — 97110 THERAPEUTIC EXERCISES: CPT | Mod: CQ,GP

## 2024-05-30 PROCEDURE — 36415 COLL VENOUS BLD VENIPUNCTURE: CPT | Performed by: ORTHOPAEDIC SURGERY

## 2024-05-30 PROCEDURE — 85027 COMPLETE CBC AUTOMATED: CPT | Performed by: ORTHOPAEDIC SURGERY

## 2024-05-30 PROCEDURE — 97161 PT EVAL LOW COMPLEX 20 MIN: CPT | Mod: GP

## 2024-05-30 PROCEDURE — RXMED WILLOW AMBULATORY MEDICATION CHARGE

## 2024-05-30 PROCEDURE — 2500000001 HC RX 250 WO HCPCS SELF ADMINISTERED DRUGS (ALT 637 FOR MEDICARE OP): Performed by: ORTHOPAEDIC SURGERY

## 2024-05-30 PROCEDURE — 97535 SELF CARE MNGMENT TRAINING: CPT | Mod: CO,GO

## 2024-05-30 PROCEDURE — 97116 GAIT TRAINING THERAPY: CPT | Mod: CQ,GP

## 2024-05-30 PROCEDURE — 2500000005 HC RX 250 GENERAL PHARMACY W/O HCPCS: Performed by: ORTHOPAEDIC SURGERY

## 2024-05-30 PROCEDURE — 2500000004 HC RX 250 GENERAL PHARMACY W/ HCPCS (ALT 636 FOR OP/ED): Performed by: CLINICAL NURSE SPECIALIST

## 2024-05-30 PROCEDURE — 2500000001 HC RX 250 WO HCPCS SELF ADMINISTERED DRUGS (ALT 637 FOR MEDICARE OP): Performed by: NURSE PRACTITIONER

## 2024-05-30 RX ORDER — ONDANSETRON 4 MG/1
4 TABLET, FILM COATED ORAL EVERY 8 HOURS PRN
Qty: 20 TABLET | Refills: 0 | Status: SHIPPED | OUTPATIENT
Start: 2024-05-30

## 2024-05-30 RX ADMIN — KETOROLAC TROMETHAMINE 15 MG: 30 INJECTION, SOLUTION INTRAMUSCULAR; INTRAVENOUS at 05:32

## 2024-05-30 RX ADMIN — RIVAROXABAN 10 MG: 10 TABLET, FILM COATED ORAL at 08:43

## 2024-05-30 RX ADMIN — HYDROCODONE BITARTRATE AND ACETAMINOPHEN 2 TABLET: 5; 325 TABLET ORAL at 12:17

## 2024-05-30 RX ADMIN — TRAMADOL HYDROCHLORIDE 50 MG: 50 TABLET, COATED ORAL at 11:32

## 2024-05-30 RX ADMIN — ACETAMINOPHEN 650 MG: 325 TABLET ORAL at 11:32

## 2024-05-30 RX ADMIN — ONDANSETRON HYDROCHLORIDE 4 MG: 4 TABLET, FILM COATED ORAL at 12:24

## 2024-05-30 RX ADMIN — CEFAZOLIN SODIUM 2 G: 2 INJECTION, SOLUTION INTRAVENOUS at 05:32

## 2024-05-30 RX ADMIN — CITALOPRAM HYDROBROMIDE 20 MG: 20 TABLET ORAL at 08:43

## 2024-05-30 RX ADMIN — METOPROLOL TARTRATE 25 MG: 25 TABLET, FILM COATED ORAL at 08:43

## 2024-05-30 RX ADMIN — TRAMADOL HYDROCHLORIDE 50 MG: 50 TABLET, COATED ORAL at 06:30

## 2024-05-30 RX ADMIN — KETOROLAC TROMETHAMINE 15 MG: 30 INJECTION, SOLUTION INTRAMUSCULAR at 10:22

## 2024-05-30 RX ADMIN — LORATADINE 10 MG: 10 TABLET ORAL at 08:43

## 2024-05-30 RX ADMIN — HYDROCODONE BITARTRATE AND ACETAMINOPHEN 2 TABLET: 5; 325 TABLET ORAL at 05:34

## 2024-05-30 RX ADMIN — ATORVASTATIN CALCIUM 40 MG: 40 TABLET, FILM COATED ORAL at 08:43

## 2024-05-30 RX ADMIN — ACETAMINOPHEN 650 MG: 325 TABLET ORAL at 05:32

## 2024-05-30 RX ADMIN — DOCUSATE SODIUM 100 MG: 100 CAPSULE, LIQUID FILLED ORAL at 08:43

## 2024-05-30 RX ADMIN — LOSARTAN POTASSIUM 100 MG: 50 TABLET, FILM COATED ORAL at 08:43

## 2024-05-30 ASSESSMENT — COGNITIVE AND FUNCTIONAL STATUS - GENERAL
STANDING UP FROM CHAIR USING ARMS: A LITTLE
PERSONAL GROOMING: A LITTLE
STANDING UP FROM CHAIR USING ARMS: A LITTLE
DRESSING REGULAR UPPER BODY CLOTHING: A LITTLE
MOVING TO AND FROM BED TO CHAIR: A LITTLE
MOVING TO AND FROM BED TO CHAIR: A LITTLE
WALKING IN HOSPITAL ROOM: A LITTLE
DRESSING REGULAR UPPER BODY CLOTHING: A LITTLE
HELP NEEDED FOR BATHING: A LITTLE
DAILY ACTIVITIY SCORE: 20
MOBILITY SCORE: 18
EATING MEALS: A LITTLE
MOVING FROM LYING ON BACK TO SITTING ON SIDE OF FLAT BED WITH BEDRAILS: A LITTLE
WALKING IN HOSPITAL ROOM: A LITTLE
DRESSING REGULAR LOWER BODY CLOTHING: A LITTLE
MOBILITY SCORE: 18
CLIMB 3 TO 5 STEPS WITH RAILING: A LITTLE
DRESSING REGULAR LOWER BODY CLOTHING: A LITTLE
DAILY ACTIVITIY SCORE: 21
WALKING IN HOSPITAL ROOM: A LITTLE
STANDING UP FROM CHAIR USING ARMS: A LITTLE
MOVING TO AND FROM BED TO CHAIR: A LITTLE
MOBILITY SCORE: 20
TOILETING: A LITTLE
TURNING FROM BACK TO SIDE WHILE IN FLAT BAD: A LITTLE
TOILETING: A LITTLE
CLIMB 3 TO 5 STEPS WITH RAILING: A LITTLE
DRESSING REGULAR LOWER BODY CLOTHING: A LITTLE
CLIMB 3 TO 5 STEPS WITH RAILING: A LITTLE
MOVING FROM LYING ON BACK TO SITTING ON SIDE OF FLAT BED WITH BEDRAILS: A LITTLE
TURNING FROM BACK TO SIDE WHILE IN FLAT BAD: A LITTLE
HELP NEEDED FOR BATHING: A LITTLE
HELP NEEDED FOR BATHING: A LITTLE
TOILETING: A LITTLE
DAILY ACTIVITIY SCORE: 18

## 2024-05-30 ASSESSMENT — PAIN SCALES - GENERAL
PAINLEVEL_OUTOF10: 9
PAINLEVEL_OUTOF10: 8
PAINLEVEL_OUTOF10: 5 - MODERATE PAIN
PAINLEVEL_OUTOF10: 5 - MODERATE PAIN

## 2024-05-30 ASSESSMENT — PAIN - FUNCTIONAL ASSESSMENT
PAIN_FUNCTIONAL_ASSESSMENT: 0-10
PAIN_FUNCTIONAL_ASSESSMENT: 0-10

## 2024-05-30 ASSESSMENT — ACTIVITIES OF DAILY LIVING (ADL)
BATHING_WHERE_ASSESSED: STANDING SINKSIDE
HOME_MANAGEMENT_TIME_ENTRY: 28
BATHING_LEVEL_OF_ASSISTANCE: CLOSE SUPERVISION

## 2024-05-30 ASSESSMENT — PAIN DESCRIPTION - LOCATION: LOCATION: KNEE

## 2024-05-30 NOTE — HH CARE COORDINATION
Home Care received a Referral for Nursing and Physical Therapy. We have processed the referral for a Start of Care on 6/1/24.     If you have any questions or concerns, please feel free to contact us at [phone]. Follow the prompts, enter your five digit zip code, and you will be directed to your care team on [facility] 1.

## 2024-05-30 NOTE — PROGRESS NOTES
Physical Therapy    Physical Therapy Evaluation    Patient Name: Thelma Orosco  MRN: [id number]  Today's Date: 5/30/2024   Time Calculation  Start Time: 0831  Stop Time: 0844  Time Calculation (min): 13 min    Assessment/Plan   PT Assessment  PT Assessment Results: Decreased strength, Decreased range of motion, Decreased endurance, Impaired balance, Decreased mobility, Pain  End of Session Patient Position: Up in chair, Alarm off, not on at start of session  IP OR SWING BED PT PLAN  Inpatient or Swing Bed: Inpatient  PT Plan  Treatment/Interventions: Bed mobility, Transfer training, Gait training, Stair training, Strengthening, Range of motion  PT Plan: Skilled PT  PT Frequency: Daily  PT Discharge Recommendations: Low intensity level of continued care  PT - OK to Discharge: Yes      Subjective   General Visit Information:  General  Reason for Referral: 5/29 r tkr  Past Medical History Relevant to Rehab: mi, depression, cad, htn, hld  Prior to Session Communication: Bedside nurse  Patient Position Received: Up in chair, Alarm off, not on at start of session  Home Living:  Home Living  Type of Home: House  Lives With: Significant other  Home Adaptive Equipment: Walker rolling or standard  Home Access: Ramped entrance (+ 1 steps w/o hr)  Prior Level of Function:  Prior Function Per Pt/Caregiver Report  Level of [place]:  (indep no ad)  Homemaking Assistance:  (shared, drives, works)  Precautions:  Precautions  Precautions Comment: oob, wbat, tkr  Vital Signs:       Objective   Pain:  Pain Assessment  Pain Score: 5 - Moderate pain (r knee)  Cognition:  Cognition  Overall Cognitive Status: Within Functional Limits    General Assessments:                  Sensation  Sensation Comment: no n&t  Functional Assessments:       Transfers  Transfer:  (sba)    Ambulation/Gait Training  Ambulation/Gait Training Performed:  (sba w/ rw 30ft, slowed,)  Extremity/Trunk Assessments:  RLE   RLE :  (hip/ankle wfl, knee 3-/5) lacks  end range knee flexion and ext   LLE   LLE :  (wfl)  Outcome Measures:  [facility] Basic Mobility  Turning from your back to your side while in a flat bed without using bedrails: None  Moving from lying on your back to sitting on the side of a flat bed without using bedrails: None  Moving to and from bed to chair (including a wheelchair): A little  Standing up from a chair using your arms (e.g. wheelchair or bedside chair): A little  To walk in hospital room: A little  Climbing 3-5 steps with railing: A little  Basic Mobility - Total Score: 20    Encounter Problems       Encounter Problems (Active)       PT Problem       PT Goal 1 (Progressing)       Start:  05/30/24    Expected End:  05/31/24       Indep txs and bed mob         PT Goal 2 (Progressing)       Start:  05/30/24    Expected End:  05/31/24       Mod indep/s amb w/ rw 150ft x3         PT Goal 3 (Progressing)       Start:  05/30/24    Expected End:  05/31/24       20-30 reps tkr there ex         PT Goal 4 (Progressing)       Start:  05/30/24    Expected End:  05/31/24       Tkr 0-100 degrees          PT Goal 5 (Progressing)       Start:  05/30/24    Expected End:  05/31/24       4 steps w/ hr  and straight cane sba, 4 steps w/o hr using  straight cane cga            Pain - Adult              Education Documentation  Handouts, taught by Valerie Calle PT at 5/30/2024  9:01 AM.  Learner: Patient  Readiness: Acceptance  Method: Explanation  Response: Verbalizes Understanding    Precautions, taught by Valerie Calle PT at 5/30/2024  9:01 AM.  Learner: Patient  Readiness: Acceptance  Method: Explanation  Response: Verbalizes Understanding    Home Exercise Program, taught by Valerie Calle PT at 5/30/2024  9:01 AM.  Learner: Patient  Readiness: Acceptance  Method: Explanation  Response: Verbalizes Understanding    Mobility Training, taught by Valerie Calle PT at 5/30/2024  9:01 AM.  Learner: Patient  Readiness: Acceptance  Method: Explanation  Response:  Verbalizes Understanding    Education Comments  No comments found.

## 2024-05-30 NOTE — PROGRESS NOTES
Physical Therapy    Physical Therapy Treatment    Patient Name: Thelma Orosco  MRN: [id number]  Today's Date: 5/30/2024  Time Calculation  Start Time: 1101  Stop Time: 1146  Time Calculation (min): 45 min    Assessment/Plan         PT Plan  Treatment/Interventions: Bed mobility, Transfer training, Gait training, Stair training, Strengthening, Range of motion  PT Plan: Skilled PT  PT Frequency: Daily  PT Discharge Recommendations: Low intensity level of continued care  PT - OK to Discharge: Yes      General Visit Information:   PT  Visit  PT Received On: 05/30/24       Subjective           Objective   Pain: 6/10 r knee                    Treatments:  Therapeutic Exercise  Therapeutic Exercise Performed:  (supine rle ap's, qs,hs, saqs,slrs, laqs, sitting heelslides x 20 reps ea   r knee arom 5-90 degrees  cold pack to r knee post rx)    Bed Mobility  Bed Mobility:  (supine to sit and sit to supine sba)    Ambulation/Gait Training  Ambulation/Gait Training Performed:  (ambulated 80 feet x 2 using fixed wheeled walker sba)  Transfers  Transfer:  (sit to stand sba)    Stairs  Stairs:  (ascended/descended 6 inch platform step cga)    Outcome Measures:  [facility] Basic Mobility  Turning from your back to your side while in a flat bed without using bedrails: A little  Moving from lying on your back to sitting on the side of a flat bed without using bedrails: A little  Moving to and from bed to chair (including a wheelchair): A little  Standing up from a chair using your arms (e.g. wheelchair or bedside chair): A little  To walk in hospital room: A little  Climbing 3-5 steps with railing: A little  Basic Mobility - Total Score: 18    Education Documentation  Mobility Training, taught by Edwige Jaramillo PTA at 5/30/2024  1:34 PM.  Learner: Patient  Readiness: Acceptance  Method: Demonstration  Response: Demonstrated Understanding    Education Comments  No comments found.           Encounter Problems       Encounter Problems  (Active)       PT Problem       PT Goal 1 (Progressing)       Start:  05/30/24    Expected End:  05/31/24       Indep txs and bed mob         PT Goal 2 (Progressing)       Start:  05/30/24    Expected End:  05/31/24       Mod indep/s amb w/ rw 150ft x3         PT Goal 3 (Progressing)       Start:  05/30/24    Expected End:  05/31/24       20-30 reps tkr there ex         PT Goal 4 (Progressing)       Start:  05/30/24    Expected End:  05/31/24       Tkr 0-100 degrees          PT Goal 5 (Progressing)       Start:  05/30/24    Expected End:  05/31/24       4 steps w/ hr  and straight cane sba, 4 steps w/o hr using  straight cane cga               Encounter Problems (Resolved)       Pain - Adult

## 2024-05-30 NOTE — PROGRESS NOTES
Occupational Therapy    OT Treatment    Patient Name: Thelma Orosco  MRN: [id number]  Today's Date: 5/30/2024  Time Calculation  Start Time: 0945  Stop Time: 1013  Time Calculation (min): 28 min         Assessment:  End of Session Patient Position: Up in chair, Alarm off, not on at start of session     Plan:  Treatment Interventions: ADL retraining, Functional transfer training, Neuromuscular reeducation, Compensatory technique education  OT Frequency: 2 times per week  OT Discharge Recommendations: Low intensity level of continued care  OT - OK to Discharge: Yes (from an O.T. standpoint)  Treatment Interventions: ADL retraining, Functional transfer training, Neuromuscular reeducation, Compensatory technique education    Subjective   Previous Visit Info:  OT Last Visit  OT Received On: 05/30/24  General:  General  Prior to Session Communication: Bedside nurse  Patient Position Received: Up in chair, Alarm off, not on at start of session  General Comment: pleasant and cooperative  Precautions:  LE Weight Bearing Status: Weight Bearing as Tolerated  Medical Precautions: Fall precautions  Post-Surgical Precautions: Right total knee precautions       Pain:  Pain Assessment  Pain Assessment:  (c/o pain in R knee, ice pack applied following tx session)    Objective         Activities of Daily Living: LE Bathing  LE Bathing Level of Assistance: Close supervision (to complete kathya area)  LE Bathing Where Assessed: Standing sinkside    LE Dressing  LE Dressing: Yes  Sock Level of Assistance: Close supervision  Shoe Level of Assistance: Close supervision  Compression Hose Level of Assistance: Minimum assistance. Pt educated on use of compression stockings, importance of compression stockings, donning/doffing, and wear schedule.   Adult Briefs Level of Assistance: Close supervision  LE Dressing Where Assessed: Chair  Pt educated on TKR precautions as applied to self care tasks and transfers. Pt verbalized and demonstrated  understanding throughout tx session.    Functional Standing Tolerance:  Time: 5:00 standing to complete grooming tasks  Bed Mobility/Transfers: Transfers  Transfer: Yes  Transfer 1  Technique 1: Sit to stand, Stand to sit  Transfer Device 1: Walker  Transfer Level of Assistance 1:  (SBA with min vc for increased safety)    Car Transfers  Car Transfers Comments: .Pt educated on transferring in/out of car adhering to precautions. Following demonstration from this therapist pt verbalized understanding.        Functional Mobility:  Functional Mobility  Functional Mobility Performed: Yes  Functional Mobility 1  Device 1: Rolling walker  Assistance 1: Close supervision  Comments 1: pt able to ambulate in/out of bathroom      Outcome Measures:[facility] Daily Activity  Putting on and taking off regular lower body clothing: A little  Bathing (including washing, rinsing, drying): A little  Putting on and taking off regular upper body clothing: None  Toileting, which includes using toilet, bedpan or urinal: A little  Taking care of personal grooming such as brushing teeth: None  Eating Meals: None  Daily Activity - Total Score: 21        Education Documentation  No documentation found.  Education Comments  No comments found.            Goals:  Encounter Problems       Encounter Problems (Active)       OT Goals       Increase LE bathing, dressing & toileting to supervision with adaptive equipment as needed.  (Progressing)       Start:  05/30/24    Expected End:  05/31/24            Increase functional transfers to/from bed, chair & commode and simulated car to supervision with DME for safety  (Progressing)       Start:  05/30/24    Expected End:  05/31/24            Demonstrate compliance to post op precautions and safety techs during ADLs  (Progressing)       Start:  05/30/24    Expected End:  05/31/24            Increase functional mobility during simulated home tasks to supervision with DME for safety (Progressing)       Start:   05/30/24    Expected End:  05/31/24

## 2024-05-30 NOTE — PROGRESS NOTES
"Thelma Orosco is a 68 y.o. female on day 0 of admission presenting with Arthritis of knee, right.    Subjective   Patient c/o moderate to severe incisional pain       Objective     Physical Exam  Vitals reviewed.   Musculoskeletal:         General: Swelling present.      Comments: Neurovascular status WNL RLE   Skin:     Comments: Mepilex dressing D&I to RLE   Neurological:      Mental Status: She is alert.         Last Recorded Vitals  Blood pressure 162/81, pulse 70, temperature 37 °C (98.6 °F), temperature source Temporal, resp. rate 16, height 1.6 m (5' 3\"), weight 86.6 kg (190 lb 14.7 oz), SpO2 96%.  Intake/Output last 3 Shifts:  I/O last 3 completed shifts:  In: 2016.7 (23.3 mL/kg) [P.O.:120; I.V.:796.7 (9.2 mL/kg); IV Piggyback:1100]  Out: 450 (5.2 mL/kg) [Urine:300 (0.1 mL/kg/hr); Blood:150]  Weight: 86.6 kg     Relevant Results      Scheduled medications  acetaminophen, 650 mg, oral, q6h JHOANA  atorvastatin, 40 mg, oral, Daily  citalopram, 20 mg, oral, Daily  docusate sodium, 100 mg, oral, BID  loratadine, 10 mg, oral, Daily  losartan, 100 mg, oral, Daily  metoprolol tartrate, 25 mg, oral, BID  polyethylene glycol, 17 g, oral, Daily  rivaroxaban, 10 mg, oral, Daily  traMADol, 50 mg, oral, q6h      Continuous medications  lactated Ringer's, 100 mL/hr, Last Rate: 100 mL/hr (05/29/24 1718)  lactated Ringer's, 100 mL/hr, Last Rate: 100 mL/hr (05/29/24 1840)  oxygen, 2 L/min, Last Rate: Stopped (05/29/24 1730)      PRN medications  PRN medications: cyclobenzaprine, diphenhydrAMINE, HYDROcodone-acetaminophen, HYDROcodone-acetaminophen, ketorolac, naloxone, ondansetron **OR** ondansetron, promethazine **OR** promethazine  Results for orders placed or performed during the hospital encounter of 05/29/24 (from the past 24 hour(s))   CBC   Result Value Ref Range    WBC 8.9 4.4 - 11.3 x10*3/uL    nRBC 0.0 0.0 - 0.0 /100 WBCs    RBC 3.54 (L) 4.00 - 5.20 x10*6/uL    Hemoglobin 11.7 (L) 12.0 - 16.0 g/dL    Hematocrit 32.8 " (L) 36.0 - 46.0 %    MCV 93 80 - 100 fL    MCH 33.1 26.0 - 34.0 pg    MCHC 35.7 32.0 - 36.0 g/dL    RDW 11.4 (L) 11.5 - 14.5 %    Platelets 234 150 - 450 x10*3/uL                            Assessment/Plan   Principal Problem:    Arthritis of knee, right  Active Problems:    PONV (postoperative nausea and vomiting)    Primary hypertension    Hyperlipidemia    CAD (coronary artery disease)    Stented coronary artery    Arthritis of right knee     Continue therapy Pt/OT with 100% WBS.  Plan discharge home with [facility] later today if patient safe and medically stable  Begin xarelto 10mg daiy this am.  Bilateral SCDs to be worn while patient in bed.  Encouraged ankle pumps Q1 Wa.  Continue routine tylenol and tramadol with ice therapy and PRN norco for adequate pain control.  Patient's hemoglobin level stable at 11.7.  Na+ level slightly decreased at 133           Ginny Rivera, APRN-CNS

## 2024-05-30 NOTE — PROGRESS NOTES
[facility] Units: 0 Occupational Therapy    Evaluation    Patient Name: Thelma Orosco  MRN: [id number]  Today's Date: 5/30/2024  Time Calculation  Start Time: 0830  Stop Time: 0844  Time Calculation (min): 14 min    Assessment  IP OT Assessment  OT Assessment: Patient presents with a decline in functional status s/p surgery and would benefit from O.T. services at a low intensity to improve independence with ADLs, transfers & mobility.  Prognosis: Excellent  End of Session Communication: Bedside nurse  End of Session Patient Position: Up in chair, Alarm off, not on at start of session (call light in reach, nurse in room)    Plan:  Treatment Interventions: ADL retraining, Functional transfer training, Neuromuscular reeducation, Compensatory technique education  OT Frequency: 2 times per week  OT Discharge Recommendations: Low intensity level of continued care  OT - OK to Discharge: Yes (from an O.T. standpoint)    Subjective     Current Problem:  Patient Active Problem List   Diagnosis    Arthritis of knee, right    PONV (postoperative nausea and vomiting)    Primary hypertension    Hyperlipidemia    CAD (coronary artery disease)    Stented coronary artery    Arthritis of right knee       General:  General  Reason for Referral: OT eval & treat s/p recent surgery (5/29/24: R TKR)  Referred By: Hector Miranda  Past Medical History Relevant to Rehab: OA R knee, L TKR, depression, HTN, HLD, CAD, MI  Prior to Session Communication: Bedside nurse  Patient Position Received: Up in chair  General Comment: Per conference with RN, patient is medically stable for therapy eval    Precautions:  Precautions Comment: OOB with assist, WBAT, TKR precautions      Pain:  Pain Assessment  Pain Assessment: 0-10  Pain Score: 5 - Moderate pain  Pain Type: Surgical pain  Pain Location: Knee  Pain Orientation: Right  Pain Interventions: Cold applied, Repositioned    Objective     Cognition:  Overall Cognitive Status: Within Functional Limits        Home  Additional Area 1 Location: glabella, crows feet, forehead Living:  Home Living Comments: Lives with significant other; bed/bath 1st floor, ramp access. Independent ADLs, transfers, mobility without device. Drives, works part time. Shared IADLs with significant other. Owns wheeled walker, high toilet, stall shower with seat  & hand held shower.        ADL:  Eating Assistance: Independent  Grooming Assistance: Independent  Grooming Deficit: Setup  UE Dressing Assistance: Independent  UE Dressing Deficit: Setup  LE Dressing Assistance: Stand by  Toileting Assistance with Device: Stand by    Activity Tolerance:  Endurance: Endurance does not limit participation in activity    Bed Mobility/Transfers:    Transfers  Transfer:  (SBA sit to stand from chair)    Ambulation/Gait Training:  Functional Mobility  Functional Mobility Performed:  (SBA with wheeled walker, able to WB RLE, no buckling noted.)    Sitting Balance:  Dynamic Sitting Balance  Dynamic Sitting-Balance:  (supervision)    Standing Balance:  Dynamic Standing Balance  Dynamic Standing-Balance:  (SBA with BUE support)    Sensation:  Sensation Comment: patient denies numbness/tingling    Strength:  Strength Comments: BUE WFL    Coordination:  Movements are Fluid and Coordinated: Yes       Outcome Measures: [facility] Daily Activity  Putting on and taking off regular lower body clothing: A little  Bathing (including washing, rinsing, drying): A little  Putting on and taking off regular upper body clothing: A little  Toileting, which includes using toilet, bedpan or urinal: A little  Taking care of personal grooming such as brushing teeth: None  Eating Meals: None  Daily Activity - Total Score: 20           EDUCATION:  Education  Individual(s) Educated: Patient  Education Provided: Diagnosis & Precautions, Fall precautons  Patient/Caregiver Demonstrated Understanding: yes  Education Documentation  Precautions, taught by Elsa Quintana OT at 5/30/2024  8:56 AM.  Learner: Patient  Readiness: Acceptance  Method: Explanation,  Consent: Verbal consent obtained. Risks include but not limited to lid/brow ptosis, bruising, swelling, diplopia, temporary effect, incomplete chemical denervation. Demonstration  Response: Verbalizes Understanding    ADL Training, taught by Elsa Quintana OT at 5/30/2024  8:56 AM.  Learner: Patient  Readiness: Acceptance  Method: Explanation, Demonstration  Response: Verbalizes Understanding    Education Comments  No comments found.        Goals:   Encounter Problems       Encounter Problems (Active)       OT Goals       Increase LE bathing, dressing & toileting to supervision with adaptive equipment as needed.  (Progressing)       Start:  05/30/24    Expected End:  05/31/24            Increase functional transfers to/from bed, chair & commode and simulated car to supervision with DME for safety  (Progressing)       Start:  05/30/24    Expected End:  05/31/24            Demonstrate compliance to post op precautions and safety techs during ADLs  (Progressing)       Start:  05/30/24    Expected End:  05/31/24            Increase functional mobility during simulated home tasks to supervision with DME for safety (Progressing)       Start:  05/30/24    Expected End:  05/31/24                      Show Depressor Anguli Units: No Show Additional Area 1: Yes Additional Area 1 Units: 35 Post-Care Instructions: Patient instructed to not lie down for 4 hours and limit physical activity for 24 hours. Detail Level: Zone Dilution (U/0.1 Cc): 1 Show Inventory Tab: Show 82 yo Female c/o epigastric pain x 2 days . Patient  has been having epigastric pain with SOB and Orthopnea. Pain is described as tightness around her epigastric area to the back.  No aggravating or alleviating factors. Pain occurs when she ambulates and even when sitting down. As per her daughter she has been unable to lay flat and sits up often due to orthopnea. She also has had lower extremity edema x 3 days. Patient has been compliant with her lasix 20mg BID. As per patient's daughter her EF is 24%.   No palpitations, no fever , chills, No abd pain, N/V/D .    Hospital course:  EKG: NSR at 66 bpm with 1st degree AV block, LAD  CE x2: Negative  CT abdomen/pelvis: Mild interstitial edema and small right and trace left pleural effusions. Cardiomegaly. Age indeterminate compression deformities of the L1-L3 vertebral bodies. 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  H/H: 10.2/31.8  Platelets: 120  Na: 132  BUN/Cr: 25/1.04  Glucose: 195  HgA1C: 7.6  ProBNP: 38159  3/6 Abd US: Contracted gallbladder, limiting evaluation. No sonographic evidence of acute cholecystitis.  3/7 TTE: EF 17% An annuloplasty ring is seen in the mitral position. Moderate mitral regurgitation. Bioprosthetic aortic valve replacement. Peak transaortic valve gradient equals 15 mm Hg, mean transaortic valve gradient equals 8 mm Hg, which is probably normal in the presence of a prosthetic valve. No aortic valve regurgitation seen. Mild aortic root dilatation  (Ao: 4.0 cm at the sinuses of Valsalva). Severely dilated left atrium.  LA volume index = 52 cc/m2. Moderate left ventricular enlargement. Severe global left ventricular systolic dysfunction. Normal right ventricular size with decreased right ventricular systolic function. An annuloplasty ring is seen in the tricuspid position. Mild-moderate tricuspid regurgitation. *** No previous Echo exam.    CE negative pt ruled out for ACS. Pt noted with acute on chronic HF exacerbation started on IV lasix with improvement and transitioned to PO. Pt reported recent angiogram in 2016 with normal coronary and follow up with cardiologist for ICD evaluation. Pt reported epigastric pain, GI recommended to continue PPI defer EGD for now given comorbidity. 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 Vascular consulted no surgical intervention needed. PT recommended home PT. As per Dr Curry pt cleared for discharge on 3/8/18

## 2024-05-30 NOTE — CARE PLAN
The patient's goals for the shift include Pain control    The clinical goals for the shift include Pain control

## 2024-05-30 NOTE — DISCHARGE INSTRUCTIONS
PAIN MANAGEMENT AT HOME:  To provide the best pain control over the next few days when pain will be at it's worst, we suggest you continue to take the following medications routinely and separately to provide the best pain management.  In addition, apply ice VERY FREQUENTLY to incision.   Also use some type of alternative intervention such as music therapy, relaxation techniques, or an type of diversion (such as watching television or talking to a friend) to help control pain.         ROUTINE MEDICATIONS:            TAKE TRAMADOL EVERY 6 HOURS               TAKE TYLENOL EVERY 6 HOURS     In between, take norco as needed for breakthrough pain.  DO NOT TAKE NORCO AND TRAMADOL AT SAME TIME!!!!      Also, do NOT take more than 4000mg of acetaminophen (tylenol) in 24 hours.  Each norco tablet contains 325mg of acetaminophen      CONSTIPATION MANAGEMENT AT HOME:  Constipation is common after Joint Replacement surgery for several reasons.  A common side effect of all pain medication is constipation.  A decrease in your activity as well as change in your normal diet & appetite all contribute to the constipation.  At home, it is important to take a daily stool softener such as Colace, Milk of magnesium or senokot while you are taking pain medications to help manage the constipation.  In addition, if you do NOT have a bowel movement within 72 hours of discharge, add a laxative such as miralax.  Miralax normally takes 2 to 3 days to work so you will not receive immediate results.  It is also important to drink plenty of fluids, especially water.  Stool softeners & laxatives need water to work properly.  We also suggest you increase your fiber intake either with foods high in fiber or with some type of supplement such as benefiber or metamucil.    Foods that are high in fiber include:     Fruits such as pears, strawberries, avocado, apples, raspberries, bananas, kiwis   Vegetables such as carrots, beets, broccoli, brussel  sprouts, spinach   Lentils and kidney beans   Split peas and chickpeas   Oats, almonds, anne seeds, and sweet potatoes      ACTIVITY AT HOME:  You will need to continue with your therapy after discharge either with in home therapy or at an outpatient facility.  Most patients initially do in home therapy for about two weeks then transition to outpatient therapy.  You have freedom of choice in which in home therapy and outpatient facility you plan to use.  Most in home therapy sessions will begin within 24 to 48 hours after discharge.  After about two weeks of in home therapy, you will most likely to ready for outpatient therapy sessions.  Outpatient therapy is normally twice a week for weeks.  While you are at home it is important that you perform the exercises the therapist went over with you in the hospital 2 to 3 times a day.  After you complete your exercises, apply ice to your incision to help with swelling and pain.  You should also be getting up and walking around your house every hour with the use of your walker.  While at rest, perform, ankle pumps on each foot at least ten times.  This will help with the swelling as well as decrease your risk for blood clots.  ALWAYS USE YOUR WALKER WITH ANY TYPE OF ACTIVITY!!!!    WOUND CARE:  The bandage on your incision will stay in place for 7 days.  The bandage is waterproof so you may shower with the bandage in place.  No need to wrap or cover it.  Some drainage on your bandage is perfectly normal.  Home health care will assist you with bandage removal.  Once the bandage is removed, your incision can be left open to air if there is no drainage present.  If your incision is draining at the time of bandage removal, home health care will assist you with applying a new gauze bandage.  Gauze bandages are NOT waterproof.    Swelling in your operative extremity will peak about 72 hours after surgery and can last for weeks.  To help with the swelling make sure you are  elevating your leg and apply ice frequently.  In addition, you will receive compression stockings upon discharge from the hospital.  Make sure you are wearing these stockings on both your legs at home.  Generally we instruct you to wear during the day and remove at bedtime for the next 4 weeks.      *Apply ice to incision at least 5 times daily    *Wear bilateral davi hose stockings to lower extremities for next 4 weeks    *Do NOT take any non steroidal anti-inflammatory medications such as motrin, aleve, ibuprofen, celebrex or meloxicam    *Take daily stool softener.  If you experience any diarrhea, stop medication    *If you need a refill on your pain medication, contact your surgeon's office Monday through Thursday with as least 24 hours notice    If you have any questions or concerns please contact the Joint  Ginny Rivera at:  Office: [phone]   Cell:  [phone]  Email:  [email or web address]

## 2024-06-01 ENCOUNTER — HOME CARE VISIT (OUTPATIENT)
Dept: HOME HEALTH SERVICES | Facility: HOME HEALTH | Age: 69
End: 2024-06-01
Payer: MEDICARE

## 2024-06-01 VITALS
RESPIRATION RATE: 20 BRPM | DIASTOLIC BLOOD PRESSURE: 70 MMHG | SYSTOLIC BLOOD PRESSURE: 132 MMHG | TEMPERATURE: 98.5 F | HEART RATE: 76 BPM | OXYGEN SATURATION: 95 %

## 2024-06-01 PROCEDURE — 0023 HH SOC

## 2024-06-01 PROCEDURE — G0299 HHS/HOSPICE OF RN EA 15 MIN: HCPCS

## 2024-06-01 ASSESSMENT — ENCOUNTER SYMPTOMS
APPETITE LEVEL: POOR
PAIN LOCATION - PAIN DURATION: CONSTANT
FATIGUE: 1
PAIN LOCATION: RIGHT KNEE
SPUTUM PRODUCTION: 1
COUGH CHARACTERISTICS: PRODUCTIVE
STOOL FREQUENCY: LESS THAN DAILY
PAIN SEVERITY GOAL: 0/10
SUBJECTIVE PAIN PROGRESSION: UNCHANGED
PERSON REPORTING PAIN: PATIENT
HIGHEST PAIN SEVERITY IN PAST 24 HOURS: 10/10
TINGLING: 1
PAIN: 1
LAST BOWEL MOVEMENT: 66989
PAIN LOCATION - PAIN SEVERITY: 7/10
HEADACHES: 1
OCCASIONAL FEELINGS OF UNSTEADINESS: 1
MUSCLE WEAKNESS: 1
COUGH: 1
CHANGE IN APPETITE: DECREASED
PAIN LOCATION - EXACERBATING FACTORS: LAYING IN BED
NAUSEA: 1
SPUTUM COLOR: TAN
LOWER EXTREMITY EDEMA: 1
DEPRESSION: 0
LOSS OF SENSATION IN FEET: 1
BOWEL PATTERN NORMAL: 1
LOWEST PAIN SEVERITY IN PAST 24 HOURS: 5/10
DIZZINESS: 1
SPUTUM CONSISTENCY: THICK
PAIN LOCATION - PAIN QUALITY: ACHE, SHARP
PAIN LOCATION - PAIN FREQUENCY: CONSTANT

## 2024-06-01 ASSESSMENT — PAIN SCALES - PAIN ASSESSMENT IN ADVANCED DEMENTIA (PAINAD)
BODYLANGUAGE: 0 - RELAXED.
CONSOLABILITY: 0 - NO NEED TO CONSOLE.
CONSOLABILITY: 0
BODYLANGUAGE: 0
NEGVOCALIZATION: 0 - NONE.
FACIALEXPRESSION: 0 - SMILING OR INEXPRESSIVE.
TOTALSCORE: 0
NEGVOCALIZATION: 0
FACIALEXPRESSION: 0
BREATHING: 0

## 2024-06-01 ASSESSMENT — ACTIVITIES OF DAILY LIVING (ADL)
ENTERING_EXITING_HOME: MODERATE ASSIST
AMBULATION ASSISTANCE: 1
OASIS_M1830: 03
AMBULATION ASSISTANCE: ONE PERSON

## 2024-06-01 ASSESSMENT — LIFESTYLE VARIABLES: SMOKING_STATUS: 0

## 2024-06-02 ENCOUNTER — HOME CARE VISIT (OUTPATIENT)
Dept: HOME HEALTH SERVICES | Facility: HOME HEALTH | Age: 69
End: 2024-06-02
Payer: MEDICARE

## 2024-06-02 VITALS
SYSTOLIC BLOOD PRESSURE: 148 MMHG | TEMPERATURE: 98.1 F | HEART RATE: 77 BPM | RESPIRATION RATE: 18 BRPM | DIASTOLIC BLOOD PRESSURE: 82 MMHG

## 2024-06-02 PROCEDURE — G0151 HHCP-SERV OF PT,EA 15 MIN: HCPCS

## 2024-06-02 SDOH — HEALTH STABILITY: PHYSICAL HEALTH: PHYSICAL EXERCISE: 10

## 2024-06-02 SDOH — HEALTH STABILITY: PHYSICAL HEALTH: EXERCISE TYPE: WRITTEN

## 2024-06-02 ASSESSMENT — BALANCE ASSESSMENTS
IMMEDIATE STANDING BALANCE FIRST 5 SECONDS: 1 - STEADY BUT USES WALKER OR OTHER SUPPORT
SITTING DOWN: 1 - USES ARMS OR NOT SMOOTH MOTION
NUDGED SCORE: 1
STANDING BALANCE: 1 - STEADY BUT WIDE STANCE AND USES CANE OR OTHER SUPPORT
SITTING BALANCE: 1 - STEADY, SAFE
EYES CLOSED AT MAXIMUM POSITION NUDGED: 1 - STEADY
BALANCE SCORE: 10
ARISING SCORE: 1
ARISES: 1 - ABLE, USES ARMS TO HELP
NUDGED: 1 - STAGGERS, GRABS, CATCHES SELF
ATTEMPTS TO ARISE: 1 - ABLE, REQUIRES MORE THAN ONE ATTEMPT
TURNING 360 DEGREES STEPS: 1 - CONTINUOUS STEPS

## 2024-06-02 ASSESSMENT — ENCOUNTER SYMPTOMS
PAIN LOCATION - PAIN DURATION: VARIES
PAIN LOCATION - RELIEVING FACTORS: REST
LOWEST PAIN SEVERITY IN PAST 24 HOURS: 5/10
PAIN LOCATION - EXACERBATING FACTORS: MVMT
PERSON REPORTING PAIN: PATIENT
PAIN LOCATION - PAIN QUALITY: ACHING
LIMITED RANGE OF MOTION: 1
MUSCLE WEAKNESS: 1
ARTHRALGIAS: 1
HIGHEST PAIN SEVERITY IN PAST 24 HOURS: 10/10
PAIN LOCATION - PAIN SEVERITY: 6/10
PAIN LOCATION - PAIN FREQUENCY: INTERMITTENT
SUBJECTIVE PAIN PROGRESSION: GRADUALLY IMPROVING
PAIN: 1
PAIN SEVERITY GOAL: 3/10
PAIN LOCATION: RIGHT KNEE

## 2024-06-02 ASSESSMENT — GAIT ASSESSMENTS
PATH SCORE: 1
BALANCE AND GAIT SCORE: 16
TRUNK SCORE: 1
STEP SYMMETRY: 0 - RIGHT AND LEFT STEP LENGTH NOT EQUAL
GAIT SCORE: 6
INITIATION OF GAIT IMMEDIATELY AFTER GO: 0 - ANY HESITANCY OR MULTIPLE ATTEMPTS TO START
TRUNK: 1 - NO SWAY BUT FLEXION OF KNEES OR BACK OR SPREADS ARMS WHILE WALKING
PATH: 1 - MILD/MODERATE DEVIATION OR USES WALKING AID
STEP CONTINUITY: 0 - STOPPING OR DISCONTINUITY BETWEEN STEPS
WALKING STANCE: 0 - HEELS APART

## 2024-06-02 ASSESSMENT — ACTIVITIES OF DAILY LIVING (ADL)
CURRENT_FUNCTION: STAND BY ASSIST
AMBULATION_DISTANCE/DURATION_TOLERATED: 50'
PHYSICAL TRANSFERS ASSESSED: 1
AMBULATION ASSISTANCE: 1
AMBULATION ASSISTANCE ON FLAT SURFACES: 1
AMBULATION ASSISTANCE: STAND BY ASSIST

## 2024-06-03 ENCOUNTER — TELEPHONE (OUTPATIENT)
Dept: ORTHOPEDIC SURGERY | Facility: CLINIC | Age: 69
End: 2024-06-03
Payer: MEDICARE

## 2024-06-03 DIAGNOSIS — M17.11 ARTHRITIS OF KNEE, RIGHT: ICD-10-CM

## 2024-06-03 RX ORDER — TRAMADOL HYDROCHLORIDE 50 MG/1
50 TABLET ORAL EVERY 6 HOURS PRN
Qty: 28 TABLET | Refills: 0 | Status: SHIPPED | OUTPATIENT
Start: 2024-06-03 | End: 2024-06-12 | Stop reason: SDUPTHER

## 2024-06-03 RX ORDER — OXYCODONE HYDROCHLORIDE 5 MG/1
5 TABLET ORAL EVERY 6 HOURS PRN
Qty: 28 TABLET | Refills: 0 | Status: SHIPPED | OUTPATIENT
Start: 2024-06-03 | End: 2024-06-06 | Stop reason: SDUPTHER

## 2024-06-03 NOTE — TELEPHONE ENCOUNTER
Good morning,    Thelma is a patient of Dr. Miranda and she is asking for a refill on her Oxycodone 5mg and the Tramadol 50 mg.     I confirmed her preferred pharmacy and listed it below.b      [id number] - [facility] [address] [facility]  [address] [facility] [address]  Phone: [phone] Fax: [phone]

## 2024-06-04 NOTE — DISCHARGE SUMMARY
Discharge Diagnosis  Arthritis of knee, right  Hyponatremia  Obesity with BMI 33.82    Issues Requiring Follow-Up  none    Test Results Pending At Discharge  Pending Labs       Order Current Status    Surgical Pathology Exam In process            Hospital Course   Patient is a 68 year old female with severe osteoarthritis of right knee.  On 5/29/24, patient underwent uncomplicated right total knee replacement by Dr. Miranda.  Patient received one dose of IV antibiotics preoperatively and 2 additional doses were administered postoperatively.  Hospitalist service was placed on consult to help assist with post op medical management.  For DVT prophylaxis, patient was ordered xarelto 10mg daily and bilateral SCDs. Physical and occupational therapy were initiated with full weight bearing status.  Patient progressed well with therapy.  Her hemoglobin level was stable at 11.7 and she remained afebrile.  Her Na+ level was slightly decreased at 133.  On POD #1, patient was medically stable and safe for discharge home with [facility] follow up.  At time of discharge, patient was ambulating 80 feet with use of wheeled walker and stand by assistance.  She was also able to demonstrate safe stair climbing with contact guard assistance and her right knee range of motion was 5 to 90 degrees.  Home going script of xarelto 10mg daily for 12 doses was provided to patient along with bilateral davi hose compression stockings to be worn at home for 4 weeks.  For pain management, oxycodone and tramadol scripts were given.  Anticipate need for extended use of narcotic medications as well as higher MED requirements based on usual post operative incisional pain following total joint replacement surgery.  Patient will follow up in office with Dr. Miranda in 4 weeks.        Pertinent Physical Exam At Time of Discharge  Physical Exam    Home Medications     Medication List      START taking these medications     acetaminophen 325 mg tablet; Commonly known  as: Tylenol; Take 2 tablets   (650 mg) by mouth every 6 hours.   ondansetron 4 mg tablet; Commonly known as: Zofran; Take 1 tablet (4 mg)   by mouth every 8 hours if needed for nausea or vomiting.   polyethylene glycol 17 gram packet; Commonly known as: Glycolax,   Miralax; Take 17 g by mouth once daily as needed (take dose if you do NOT   Have bowel movement by Sunday June 2nd).   Xarelto 10 mg tablet; Generic drug: rivaroxaban; Take 1 tablet (10 mg)   by mouth once daily for 12 doses. Do not fill before May 30, 2024.     CONTINUE taking these medications     atorvastatin 40 mg tablet; Commonly known as: Lipitor   calcium citrate-vitamin D3 315 mg-5 mcg (200 unit) tablet; Commonly   known as: Citracal+D   citalopram 20 mg tablet; Commonly known as: CeleXA   cyanocobalamin 500 mcg tablet; Commonly known as: Vitamin B-12   docusate sodium 250 mg capsule   loratadine 10 mg tablet; Commonly known as: Claritin   losartan 100 mg tablet; Commonly known as: Cozaar   metoprolol tartrate 25 mg tablet; Commonly known as: Lopressor     STOP taking these medications     aspirin 81 mg chewable tablet   chlorhexidine 0.12 % solution; Commonly known as: Peridex   chlorhexidine 4 % external liquid; Commonly known as: Hibiclens   omega-3 acid ethyl esters 1 gram capsule; Commonly known as: Lovaza   vitamin E 180 mg (400 unit) capsule       Outpatient Follow-Up  Future Appointments   Date Time Provider Department Center   6/5/2024 10:00 AM Antonella Monique [facility]   6/7/2024 To Be Determined Ledy Chopra RN [facility]   6/10/2024 To Be Determined Antonella Monique [facility]   6/12/2024 To Be Determined Ledy Chopra RN [facility]   6/12/2024 To Be Determined Antonella Monique [facility]   6/17/2024 To Be Determined Antonella Monique [facility]   6/19/2024 To Be Determined Miriam Francis PT [facility]   6/28/2024 10:00 AM Hector Miranda MD [id number] [place]       Ginny Rivera, APRN-CNS   Additional Notes: Patient consent was obtained to proceed with the visit and recommended plan of care after discussion of all risks and benefits, including the risks of COVID-19 exposure. Detail Level: Simple Render Risk Assessment In Note?: no

## 2024-06-05 ENCOUNTER — HOME CARE VISIT (OUTPATIENT)
Dept: HOME HEALTH SERVICES | Facility: HOME HEALTH | Age: 69
End: 2024-06-05
Payer: MEDICARE

## 2024-06-05 PROCEDURE — G0157 HHC PT ASSISTANT EA 15: HCPCS | Mod: CQ

## 2024-06-05 ASSESSMENT — ENCOUNTER SYMPTOMS
HIGHEST PAIN SEVERITY IN PAST 24 HOURS: 9/10
LOWEST PAIN SEVERITY IN PAST 24 HOURS: 8/10
PERSON REPORTING PAIN: PATIENT
PAIN: 1

## 2024-06-06 ENCOUNTER — TELEPHONE (OUTPATIENT)
Dept: ORTHOPEDIC SURGERY | Facility: CLINIC | Age: 69
End: 2024-06-06
Payer: MEDICARE

## 2024-06-06 RX ORDER — OXYCODONE HYDROCHLORIDE 5 MG/1
5-10 TABLET ORAL EVERY 6 HOURS PRN
Qty: 36 TABLET | Refills: 0 | Status: SHIPPED | OUTPATIENT
Start: 2024-06-06 | End: 2024-06-14 | Stop reason: SDUPTHER

## 2024-06-06 NOTE — TELEPHONE ENCOUNTER
Patient calling in to discuss medications and get refills. States she has 7 oxycodone left and is taking 2 pills at a time due to it not helping her, she is wondering if there is anything else that can be sent.  She is rotating her medications with tylenol as advised. States she is unable to do her exercises as much due to the severe pain, her leg and ankle are extremely bruised at this time.        [id number] - [place], [place] - [address] [facility]  [address] [facility] [address]  Phone: [phone] Fax: [phone]

## 2024-06-07 ENCOUNTER — HOSPITAL ENCOUNTER (OUTPATIENT)
Dept: RADIOLOGY | Facility: CLINIC | Age: 69
Discharge: HOME | End: 2024-06-07
Payer: MEDICARE

## 2024-06-07 ENCOUNTER — HOME CARE VISIT (OUTPATIENT)
Dept: HOME HEALTH SERVICES | Facility: HOME HEALTH | Age: 69
End: 2024-06-07
Payer: MEDICARE

## 2024-06-07 VITALS
SYSTOLIC BLOOD PRESSURE: 128 MMHG | RESPIRATION RATE: 18 BRPM | TEMPERATURE: 98.4 F | OXYGEN SATURATION: 96 % | HEART RATE: 96 BPM | DIASTOLIC BLOOD PRESSURE: 80 MMHG

## 2024-06-07 DIAGNOSIS — M79.604 PAIN AND SWELLING OF RIGHT LOWER EXTREMITY: ICD-10-CM

## 2024-06-07 DIAGNOSIS — M79.89 PAIN AND SWELLING OF RIGHT LOWER EXTREMITY: ICD-10-CM

## 2024-06-07 PROCEDURE — G0299 HHS/HOSPICE OF RN EA 15 MIN: HCPCS

## 2024-06-07 PROCEDURE — 93971 EXTREMITY STUDY: CPT

## 2024-06-07 PROCEDURE — 93971 EXTREMITY STUDY: CPT | Performed by: RADIOLOGY

## 2024-06-07 ASSESSMENT — PAIN SCALES - PAIN ASSESSMENT IN ADVANCED DEMENTIA (PAINAD)
FACIALEXPRESSION: 0 - SMILING OR INEXPRESSIVE.
FACIALEXPRESSION: 0
BODYLANGUAGE: 0
BREATHING: 0
TOTALSCORE: 0
CONSOLABILITY: 0 - NO NEED TO CONSOLE.
NEGVOCALIZATION: 0
BODYLANGUAGE: 0 - RELAXED.
NEGVOCALIZATION: 0 - NONE.
CONSOLABILITY: 0

## 2024-06-07 ASSESSMENT — ENCOUNTER SYMPTOMS
CHANGE IN APPETITE: UNCHANGED
NAUSEA: 1
HIGHEST PAIN SEVERITY IN PAST 24 HOURS: 10/10
PAIN: 1
APPETITE LEVEL: GOOD
AGITATION: 1
PAIN LOCATION - PAIN SEVERITY: 8/10
LOSS OF SENSATION IN FEET: 1
LAST BOWEL MOVEMENT: 66997
LOWEST PAIN SEVERITY IN PAST 24 HOURS: 7/10
PAIN SEVERITY GOAL: 0/10
DEPRESSION: 0
FATIGUE: 1
OCCASIONAL FEELINGS OF UNSTEADINESS: 1
MUSCLE WEAKNESS: 1
SUBJECTIVE PAIN PROGRESSION: UNCHANGED
STOOL FREQUENCY: DAILY
BOWEL PATTERN NORMAL: 1
PAIN LOCATION: RIGHT KNEE
PERSON REPORTING PAIN: PATIENT

## 2024-06-07 ASSESSMENT — ACTIVITIES OF DAILY LIVING (ADL)
MONEY MANAGEMENT (EXPENSES/BILLS): NEEDS ASSISTANCE
AMBULATION ASSISTANCE: ONE PERSON
CURRENT_FUNCTION: ONE PERSON

## 2024-06-10 ENCOUNTER — HOME CARE VISIT (OUTPATIENT)
Dept: HOME HEALTH SERVICES | Facility: HOME HEALTH | Age: 69
End: 2024-06-10
Payer: MEDICARE

## 2024-06-10 VITALS
RESPIRATION RATE: 18 BRPM | OXYGEN SATURATION: 98 % | SYSTOLIC BLOOD PRESSURE: 136 MMHG | HEART RATE: 100 BPM | DIASTOLIC BLOOD PRESSURE: 76 MMHG

## 2024-06-10 PROCEDURE — G0157 HHC PT ASSISTANT EA 15: HCPCS | Mod: CQ

## 2024-06-10 PROCEDURE — G0180 MD CERTIFICATION HHA PATIENT: HCPCS | Performed by: ORTHOPAEDIC SURGERY

## 2024-06-10 ASSESSMENT — ENCOUNTER SYMPTOMS
HIGHEST PAIN SEVERITY IN PAST 24 HOURS: 7/10
PAIN: 1
LOWEST PAIN SEVERITY IN PAST 24 HOURS: 6/10
PERSON REPORTING PAIN: PATIENT

## 2024-06-12 ENCOUNTER — TELEPHONE (OUTPATIENT)
Dept: ORTHOPEDIC SURGERY | Facility: CLINIC | Age: 69
End: 2024-06-12
Payer: MEDICARE

## 2024-06-12 ENCOUNTER — HOME CARE VISIT (OUTPATIENT)
Dept: HOME HEALTH SERVICES | Facility: HOME HEALTH | Age: 69
End: 2024-06-12
Payer: MEDICARE

## 2024-06-12 DIAGNOSIS — M17.11 ARTHRITIS OF KNEE, RIGHT: ICD-10-CM

## 2024-06-12 PROCEDURE — G0157 HHC PT ASSISTANT EA 15: HCPCS | Mod: CQ

## 2024-06-12 RX ORDER — TRAMADOL HYDROCHLORIDE 50 MG/1
50 TABLET ORAL EVERY 6 HOURS PRN
Qty: 28 TABLET | Refills: 0 | Status: SHIPPED | OUTPATIENT
Start: 2024-06-12 | End: 2024-06-20 | Stop reason: SDUPTHER

## 2024-06-12 ASSESSMENT — ENCOUNTER SYMPTOMS
LOWEST PAIN SEVERITY IN PAST 24 HOURS: 4/10
PAIN: 1
PERSON REPORTING PAIN: PATIENT
SUBJECTIVE PAIN PROGRESSION: GRADUALLY IMPROVING
HIGHEST PAIN SEVERITY IN PAST 24 HOURS: 8/10

## 2024-06-12 NOTE — TELEPHONE ENCOUNTER
Patient calling in for refill of tramadol has 12 left. Had TKA with Dr. Miranda on 5/29.   She also asked for the oxycodone to be refilled but states she has 21 pills left. I advised her to ghanshyam back on Friday for the oxy.       [id number] [place], [place] - [address] [facility]  [address] [facility] [address]  Phone: [phone] Fax: [phone]

## 2024-06-13 LAB
LABORATORY COMMENT REPORT: NORMAL
PATH REPORT.FINAL DX SPEC: NORMAL
PATH REPORT.GROSS SPEC: NORMAL
PATH REPORT.RELEVANT HX SPEC: NORMAL
PATH REPORT.TOTAL CANCER: NORMAL

## 2024-06-14 ENCOUNTER — HOME CARE VISIT (OUTPATIENT)
Dept: HOME HEALTH SERVICES | Facility: HOME HEALTH | Age: 69
End: 2024-06-14
Payer: MEDICARE

## 2024-06-14 ENCOUNTER — TELEPHONE (OUTPATIENT)
Dept: ORTHOPEDIC SURGERY | Facility: CLINIC | Age: 69
End: 2024-06-14
Payer: MEDICARE

## 2024-06-14 VITALS
HEART RATE: 76 BPM | RESPIRATION RATE: 16 BRPM | OXYGEN SATURATION: 95 % | SYSTOLIC BLOOD PRESSURE: 138 MMHG | DIASTOLIC BLOOD PRESSURE: 80 MMHG | TEMPERATURE: 98.6 F

## 2024-06-14 DIAGNOSIS — M17.11 ARTHRITIS OF KNEE, RIGHT: ICD-10-CM

## 2024-06-14 PROCEDURE — G0299 HHS/HOSPICE OF RN EA 15 MIN: HCPCS

## 2024-06-14 RX ORDER — OXYCODONE HYDROCHLORIDE 5 MG/1
5-10 TABLET ORAL EVERY 8 HOURS PRN
Qty: 21 TABLET | Refills: 0 | Status: SHIPPED | OUTPATIENT
Start: 2024-06-14 | End: 2024-06-21

## 2024-06-14 ASSESSMENT — ENCOUNTER SYMPTOMS
LAST BOWEL MOVEMENT: 67005
DYSPNEA ACTIVITY LEVEL: AFTER AMBULATING 10 - 20 FT
DIZZINESS: 1
PAIN LOCATION - PAIN DURATION: CONSTANT
MUSCLE WEAKNESS: 1
PAIN LOCATION: RIGHT KNEE
DRY SKIN: 1
BOWEL PATTERN NORMAL: 1
OCCASIONAL FEELINGS OF UNSTEADINESS: 0
STOOL FREQUENCY: DAILY
APPETITE LEVEL: GOOD
LOSS OF SENSATION IN FEET: 1
TINGLING: 1
PAIN: 1
PAIN SEVERITY GOAL: 0/10
SHORTNESS OF BREATH: 1
SUBJECTIVE PAIN PROGRESSION: GRADUALLY IMPROVING
PAIN LOCATION - PAIN QUALITY: THROBBING, SHARP
LOWEST PAIN SEVERITY IN PAST 24 HOURS: 4/10
PAIN LOCATION - PAIN FREQUENCY: CONSTANT
NAUSEA: 1
DEPRESSION: 0
PAIN LOCATION - PAIN SEVERITY: 7/10
PAIN LOCATION - EXACERBATING FACTORS: EXERCISING
HIGHEST PAIN SEVERITY IN PAST 24 HOURS: 8/10
PERSON REPORTING PAIN: PATIENT
CHANGE IN APPETITE: INCREASED

## 2024-06-14 ASSESSMENT — PAIN SCALES - PAIN ASSESSMENT IN ADVANCED DEMENTIA (PAINAD)
NEGVOCALIZATION: 0
CONSOLABILITY: 0 - NO NEED TO CONSOLE.
NEGVOCALIZATION: 0 - NONE.
BODYLANGUAGE: 0
FACIALEXPRESSION: 0
TOTALSCORE: 0
FACIALEXPRESSION: 0 - SMILING OR INEXPRESSIVE.
BREATHING: 0
BODYLANGUAGE: 0 - RELAXED.
CONSOLABILITY: 0

## 2024-06-14 ASSESSMENT — ACTIVITIES OF DAILY LIVING (ADL)
AMBULATION ASSISTANCE: 1
AMBULATION ASSISTANCE: STAND BY ASSIST

## 2024-06-14 NOTE — TELEPHONE ENCOUNTER
Good morning,    Thelma called asking if she can receive a refill on her Oxycodone 5mg. I confirmed her pharmacy below.    Thelma also wants to know if her home health order can be used for outpatient therapy as well or if she needs a new one. If she needs a new order for outpatient therapy, can she have one please?       [id number] - [place], [place] - [address] [facility]  [address] [facility] [address]  Phone: [phone] Fax: [phone]       Thank you.

## 2024-06-18 ENCOUNTER — HOME CARE VISIT (OUTPATIENT)
Dept: HOME HEALTH SERVICES | Facility: HOME HEALTH | Age: 69
End: 2024-06-18
Payer: MEDICARE

## 2024-06-18 VITALS
SYSTOLIC BLOOD PRESSURE: 112 MMHG | TEMPERATURE: 97.8 F | OXYGEN SATURATION: 98 % | DIASTOLIC BLOOD PRESSURE: 80 MMHG | RESPIRATION RATE: 18 BRPM | HEART RATE: 90 BPM

## 2024-06-18 PROCEDURE — G0157 HHC PT ASSISTANT EA 15: HCPCS | Mod: CQ

## 2024-06-18 ASSESSMENT — GAIT ASSESSMENTS
INITIATION OF GAIT IMMEDIATELY AFTER GO: 1 - NO HESITANCY
BALANCE AND GAIT SCORE: 24
STEP SYMMETRY: 1 - RIGHT AND LEFT STEP LENGTH APPEAR EQUAL
TRUNK: 0 - MARKED SWAY OR USES WALKING AID
STEP CONTINUITY: 1 - STEPS APPEAR CONTINUOUS
PATH: 1 - MILD/MODERATE DEVIATION OR USES WALKING AID
PATH SCORE: 1
GAIT SCORE: 9
TRUNK SCORE: 0
WALKING STANCE: 1 - HEELS ALMOST TOUCHING WHILE WALKING

## 2024-06-18 ASSESSMENT — BALANCE ASSESSMENTS
NUDGED: 2 - STEADY
BALANCE SCORE: 15
TURNING 360 DEGREES STEPS: 1 - CONTINUOUS STEPS
ATTEMPTS TO ARISE: 2 - ABLE TO RISE, ONE ATTEMPT
SITTING BALANCE: 1 - STEADY, SAFE
STANDING BALANCE: 2 - NARROW STANCE WITHOUT SUPPORT
SITTING DOWN: 2 - SAFE, SMOOTH MOTION
NUDGED SCORE: 2
ARISING SCORE: 1
IMMEDIATE STANDING BALANCE FIRST 5 SECONDS: 2 - STEADY WITHOUT WALKER OR OTHER SUPPORT
ARISES: 1 - ABLE, USES ARMS TO HELP
EYES CLOSED AT MAXIMUM POSITION NUDGED: 1 - STEADY

## 2024-06-20 ENCOUNTER — TELEPHONE (OUTPATIENT)
Dept: ORTHOPEDIC SURGERY | Facility: CLINIC | Age: 69
End: 2024-06-20
Payer: MEDICARE

## 2024-06-20 ENCOUNTER — HOME CARE VISIT (OUTPATIENT)
Dept: HOME HEALTH SERVICES | Facility: HOME HEALTH | Age: 69
End: 2024-06-20
Payer: MEDICARE

## 2024-06-20 DIAGNOSIS — M17.11 ARTHRITIS OF KNEE, RIGHT: ICD-10-CM

## 2024-06-20 PROCEDURE — G0157 HHC PT ASSISTANT EA 15: HCPCS | Mod: CQ

## 2024-06-20 RX ORDER — TRAMADOL HYDROCHLORIDE 50 MG/1
50 TABLET ORAL EVERY 8 HOURS PRN
Qty: 21 TABLET | Refills: 0 | Status: SHIPPED | OUTPATIENT
Start: 2024-06-20 | End: 2024-06-27

## 2024-06-20 RX ORDER — OXYCODONE HYDROCHLORIDE 5 MG/1
5 TABLET ORAL EVERY 8 HOURS PRN
Qty: 21 TABLET | Refills: 0 | Status: SHIPPED | OUTPATIENT
Start: 2024-06-20 | End: 2024-06-27

## 2024-06-20 NOTE — TELEPHONE ENCOUNTER
Patient calling in to request a refill of tramadol has 3 left and oxycodone has 11 left (she is aware it may be too early, but will run out over the weekend).     [facility] to [facility]  [id number] - [place], [place] - [address] [facility]  [address] [facility] [address]  Phone: [phone] Fax: [phone]

## 2024-06-21 VITALS
TEMPERATURE: 98.3 F | DIASTOLIC BLOOD PRESSURE: 70 MMHG | HEART RATE: 94 BPM | SYSTOLIC BLOOD PRESSURE: 116 MMHG | RESPIRATION RATE: 18 BRPM

## 2024-06-21 ASSESSMENT — GAIT ASSESSMENTS
INITIATION OF GAIT IMMEDIATELY AFTER GO: 1 - NO HESITANCY
GAIT SCORE: 9
STEP SYMMETRY: 1 - RIGHT AND LEFT STEP LENGTH APPEAR EQUAL
TRUNK: 0 - MARKED SWAY OR USES WALKING AID
BALANCE AND GAIT SCORE: 23
STEP CONTINUITY: 1 - STEPS APPEAR CONTINUOUS
PATH: 1 - MILD/MODERATE DEVIATION OR USES WALKING AID
TRUNK SCORE: 0
WALKING STANCE: 1 - HEELS ALMOST TOUCHING WHILE WALKING
PATH SCORE: 1

## 2024-06-21 ASSESSMENT — BALANCE ASSESSMENTS
EYES CLOSED AT MAXIMUM POSITION NUDGED: 1 - STEADY
SITTING BALANCE: 1 - STEADY, SAFE
TURNING 360 DEGREES STEPS: 1 - CONTINUOUS STEPS
ARISES: 1 - ABLE, USES ARMS TO HELP
BALANCE SCORE: 14
SITTING DOWN: 1 - USES ARMS OR NOT SMOOTH MOTION
NUDGED: 2 - STEADY
ARISING SCORE: 1
NUDGED SCORE: 2
ATTEMPTS TO ARISE: 2 - ABLE TO RISE, ONE ATTEMPT
STANDING BALANCE: 2 - NARROW STANCE WITHOUT SUPPORT
IMMEDIATE STANDING BALANCE FIRST 5 SECONDS: 2 - STEADY WITHOUT WALKER OR OTHER SUPPORT

## 2024-06-21 ASSESSMENT — ENCOUNTER SYMPTOMS
HIGHEST PAIN SEVERITY IN PAST 24 HOURS: 8/10
LOWEST PAIN SEVERITY IN PAST 24 HOURS: 2/10
PAIN: 1
PERSON REPORTING PAIN: PATIENT
PAIN: R KNEE

## 2024-06-24 ENCOUNTER — HOME CARE VISIT (OUTPATIENT)
Dept: HOME HEALTH SERVICES | Facility: HOME HEALTH | Age: 69
End: 2024-06-24
Payer: MEDICARE

## 2024-06-24 VITALS
DIASTOLIC BLOOD PRESSURE: 80 MMHG | SYSTOLIC BLOOD PRESSURE: 124 MMHG | TEMPERATURE: 97.8 F | HEART RATE: 84 BPM | RESPIRATION RATE: 16 BRPM

## 2024-06-24 PROCEDURE — G0157 HHC PT ASSISTANT EA 15: HCPCS | Mod: CQ

## 2024-06-24 ASSESSMENT — ENCOUNTER SYMPTOMS
HIGHEST PAIN SEVERITY IN PAST 24 HOURS: 7/10
PAIN: 1
PERSON REPORTING PAIN: PATIENT
LOWEST PAIN SEVERITY IN PAST 24 HOURS: 6/10

## 2024-06-28 ENCOUNTER — APPOINTMENT (OUTPATIENT)
Dept: ORTHOPEDIC SURGERY | Facility: CLINIC | Age: 69
End: 2024-06-28
Payer: MEDICARE

## 2024-06-28 ENCOUNTER — HOSPITAL ENCOUNTER (OUTPATIENT)
Dept: RADIOLOGY | Facility: CLINIC | Age: 69
Discharge: HOME | End: 2024-06-28
Payer: MEDICARE

## 2024-06-28 VITALS — WEIGHT: 186 LBS | HEIGHT: 63 IN | BODY MASS INDEX: 32.96 KG/M2

## 2024-06-28 DIAGNOSIS — Z96.651 STATUS POST RIGHT KNEE REPLACEMENT: ICD-10-CM

## 2024-06-28 PROCEDURE — 1036F TOBACCO NON-USER: CPT | Performed by: ORTHOPAEDIC SURGERY

## 2024-06-28 PROCEDURE — 99024 POSTOP FOLLOW-UP VISIT: CPT | Performed by: ORTHOPAEDIC SURGERY

## 2024-06-28 PROCEDURE — 73560 X-RAY EXAM OF KNEE 1 OR 2: CPT | Mod: RT

## 2024-06-28 RX ORDER — OXYCODONE HYDROCHLORIDE 5 MG/1
5 TABLET ORAL EVERY 6 HOURS PRN
Qty: 28 TABLET | Refills: 0 | Status: SHIPPED | OUTPATIENT
Start: 2024-06-28 | End: 2024-07-05

## 2024-06-28 RX ORDER — TRAMADOL HYDROCHLORIDE 50 MG/1
50 TABLET ORAL EVERY 8 HOURS PRN
Qty: 28 TABLET | Refills: 0 | Status: SHIPPED | OUTPATIENT
Start: 2024-06-28 | End: 2024-07-07

## 2024-06-28 NOTE — LETTER
June 28, 2024     Patient: Thelma Orosco   YOB: 1955   Date of Visit: 6/28/2024       To Whom It May Concern:    It is my medical opinion that Thelma Orosco may return to work on 08/01/2024 .    If you have any questions or concerns, please don't hesitate to call.         Sincerely,        Hector Miranda MD    CC: No Recipients

## 2024-06-28 NOTE — PROGRESS NOTES
Seen in follow-up after total knee arthroplasty.  Progressing as expected.    Incision healed without evidence of infection.  There is resolving swelling.    Range of motion 2-95    AP/lateral x-rays of the knee are personally reviewed and the total knee arthroplasty is in good position and well fixed.    Progressing as expected.  Precautions were reviewed.  Continue with physical therapy and follow-up in 3 months with x-rays.

## 2024-06-29 ENCOUNTER — HOME CARE VISIT (OUTPATIENT)
Dept: HOME HEALTH SERVICES | Facility: HOME HEALTH | Age: 69
End: 2024-06-29
Payer: MEDICARE

## 2024-06-29 VITALS
RESPIRATION RATE: 18 BRPM | TEMPERATURE: 98.6 F | OXYGEN SATURATION: 99 % | SYSTOLIC BLOOD PRESSURE: 140 MMHG | HEART RATE: 70 BPM | DIASTOLIC BLOOD PRESSURE: 91 MMHG

## 2024-06-29 PROCEDURE — G0151 HHCP-SERV OF PT,EA 15 MIN: HCPCS

## 2024-06-29 SDOH — HEALTH STABILITY: PHYSICAL HEALTH: EXERCISE TYPE: INDEPENDENT

## 2024-06-29 ASSESSMENT — GAIT ASSESSMENTS
PATH: 1 - MILD/MODERATE DEVIATION OR USES WALKING AID
INITIATION OF GAIT IMMEDIATELY AFTER GO: 0 - ANY HESITANCY OR MULTIPLE ATTEMPTS TO START
BALANCE AND GAIT SCORE: 24
PATH SCORE: 1
STEP CONTINUITY: 1 - STEPS APPEAR CONTINUOUS
GAIT SCORE: 9
TRUNK: 1 - NO SWAY BUT FLEXION OF KNEES OR BACK OR SPREADS ARMS WHILE WALKING
STEP SYMMETRY: 1 - RIGHT AND LEFT STEP LENGTH APPEAR EQUAL
TRUNK SCORE: 1
WALKING STANCE: 1 - HEELS ALMOST TOUCHING WHILE WALKING

## 2024-06-29 ASSESSMENT — ACTIVITIES OF DAILY LIVING (ADL)
PHYSICAL TRANSFERS ASSESSED: 1
CURRENT_FUNCTION: INDEPENDENT
OASIS_M1830: 00
AMBULATION ASSISTANCE: 1
HOME_HEALTH_OASIS: 00
AMBULATION ASSISTANCE: INDEPENDENT

## 2024-06-29 ASSESSMENT — BALANCE ASSESSMENTS
NUDGED: 2 - STEADY
SITTING BALANCE: 1 - STEADY, SAFE
ARISES: 2 - ABLE WITHOUT USING ARMS
ATTEMPTS TO ARISE: 2 - ABLE TO RISE, ONE ATTEMPT
EYES CLOSED AT MAXIMUM POSITION NUDGED: 1 - STEADY
STANDING BALANCE: 2 - NARROW STANCE WITHOUT SUPPORT
ARISING SCORE: 2
TURNING 360 DEGREES STEPS: 1 - CONTINUOUS STEPS
NUDGED SCORE: 2
IMMEDIATE STANDING BALANCE FIRST 5 SECONDS: 2 - STEADY WITHOUT WALKER OR OTHER SUPPORT
SITTING DOWN: 1 - USES ARMS OR NOT SMOOTH MOTION
BALANCE SCORE: 15

## 2024-06-29 ASSESSMENT — ENCOUNTER SYMPTOMS
PAIN LOCATION - PAIN DURATION: VARIES
PAIN LOCATION - RELIEVING FACTORS: REST
PAIN LOCATION - EXACERBATING FACTORS: MVMT
SUBJECTIVE PAIN PROGRESSION: GRADUALLY IMPROVING
PAIN LOCATION - PAIN FREQUENCY: INTERMITTENT
HIGHEST PAIN SEVERITY IN PAST 24 HOURS: 7/10
PAIN LOCATION - PAIN SEVERITY: 5/10
PAIN LOCATION - PAIN QUALITY: ACHING
PAIN: 1
PAIN SEVERITY GOAL: 1/10
PERSON REPORTING PAIN: PATIENT
PAIN LOCATION: RIGHT KNEE
LOWEST PAIN SEVERITY IN PAST 24 HOURS: 2/10

## 2024-07-03 ENCOUNTER — EVALUATION (OUTPATIENT)
Dept: PHYSICAL THERAPY | Facility: CLINIC | Age: 69
End: 2024-07-03
Payer: MEDICARE

## 2024-07-03 DIAGNOSIS — M25.561 ACUTE PAIN OF RIGHT KNEE: Primary | ICD-10-CM

## 2024-07-03 DIAGNOSIS — Z96.651 HISTORY OF TOTAL RIGHT KNEE REPLACEMENT: ICD-10-CM

## 2024-07-03 PROCEDURE — 97161 PT EVAL LOW COMPLEX 20 MIN: CPT | Mod: GP

## 2024-07-03 PROCEDURE — 97110 THERAPEUTIC EXERCISES: CPT | Mod: GP

## 2024-07-03 ASSESSMENT — ENCOUNTER SYMPTOMS
DEPRESSION: 0
LOSS OF SENSATION IN FEET: 0
OCCASIONAL FEELINGS OF UNSTEADINESS: 0

## 2024-07-03 NOTE — PROGRESS NOTES
Physical Therapy    Physical Therapy Evaluation and Treatment      Patient Name: Thelma Orosco  MRN: [id number]  Today's Date: 7/3/2024    Time Calculation  Start Time: 1140  Stop Time: 1215  Time Calculation (min): 35 min    Current Problem:   1. Acute pain of right knee  Follow Up In Physical Therapy      2. History of total right knee replacement  Referral to Physical Therapy          SUBJECTIVE:  Thelma Orosco is a 69 y.o. female referred to PT for history of total right knee replacement. Patient presents with chief complaint of acute pain of right knee. Patient presents 5 weeks s/p R TKA by Dr. Miranda. Patient reports that she stayed one night in hospital then was discharged home. Denies complications with surgery. Patient reports that she had home health PT from 6/2/24 to 6/29/24. Patient reports that she was discharged home with walker, used it for 3 weeks and then was transitioning to cane but had trouble with sequencing using L UE. Patient reports that she feels stable walking but is cautious when walking on grass. Patient reports that overall she is feeling better, can feel increased pain with weather changes such as rain. Patient reports that after surgery had R calf and thigh pain. Patient reports that it was painful to touch and feels hard on medial ankle. Patient got doppler US on 6/7/24 that ruled out DVT. Patient has since mentioned it to MD and was told that it will improved, overall symptoms have improved but still present. Denies trouble with incision.   Denies using assistive device prior to surgery.   Patient reports that she will be going on vacation 7/13-7/20 to [place] with family, reports that she will not be on beach but will be in pool not swimming but holding grandkids. Patient notes that she does have a second floor condo so she will have to do stairs.     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     Relevant Past Medical History:Reviewed in chart; HTN, CAD, depression, hyperlipidemia, MI (5/27/2014)  Surgical History: Reviewed in chart; L TKA (9/2010), R TKA (5/29/204)  Medications: Reviewed in chart; Atorvastatin, losartan, baby asprin, citalopram,  Allergies: codeine, morphine, lisinopril    Precautions: PMHx considerations: HTN, CAD, depression, hyperlipidemia, MI (5/27/2014)  STEADI Fall Risk: 2 (score of 4+ indicates fall risk)       PT Outcome Measure:   WOMAC: 40/96= 41.7%     Pain:  Lowest:  2/10  Highest:  6/10  Location:  R lateral knee, calf and thigh   Description:  throbbing, numbness, stabbing   Aggravating Factors:  exercise, sitting for too long, walking   Relieving Factors:  ice   Sleep Pattern:  trouble sleeping secondary to pain, will use ice pack- now able to slep full night in bed   Previous Interventions:  HHPT: 6/2/24 - 6/29/24       Red flags: improved night sweats, appetite, overall pain, swelling redness     Patient/Family Goal: \"range of motion and walk properly\"     OBJECTIVE:    Observation/Posture: Incision healing well, no increased redness or warmth     Gait:  Deviations: Patient ambulates with antalgic gait pattern secondary to decreased R knee extension    Functional Mobility:   TUG: 10 seconds   30 second sit to stand: 9 reps    Range of Motion:  KNEE ROM LEFT RIGHT    AROM AROM   Flexion (0-135) 125 102   Extension (0) 0 5     Strength:  HIP MMT LEFT RIGHT   Flexion 5/5 4+/5   Adduction 5/5 4+/5   Abduction 5/5 4/5     KNEE MMT LEFT RIGHT   Flexion 5/5 4/5   Extension 5/5 4/5     ANKLE MMT LEFT RIGHT   Dorsiflexion 5/5 5/5     Sam's Test: negative on R LE    Treatments:  Therapeutic Exercise: (8 " minutes)  Went over HEP from HHPT with PT demonstration  Sit to stand   LAQ  Heel slide     Manual Therapy: ( minutes)    Gait Training: ( minutes)    Neuromuscular Re-education: ( minutes)    Therapeutic Activity: (2 minutes)  OP Education: Initial evaluation completed, findings and plan of care discussed with patient. Patient education on purpose of interventions in order to improve gait mechanics, function and strength. Encouraged patient to continue with HEP from HHPT, will continue to progress interventions through follow up visits.      HEP:  HHPT from HEP:  Supine: QS with towel, SAQ, SLR, Bridge, hip ABD, HS stretch, heel slide, ankle pumps  Seated: LAQ, marching, knee flexion   Standing: hip ABD, marching, HS curl, mini squat, heel raises     ASSESSMENT:  Thelma Orosco is a 69 y.o. female referred to PT for history of total right knee replacement. Patient presents with chief complaint of acute pain of right knee. Patient presents 5 weeks s/p R TKA by Dr. Miranda. Patient demonstrates decreased R knee AROM in flexion and extension impacting gait mechanics and reciprocal stair negotiation. Decreased gross LE strength impacting functional mobility. Patient ambulates with antalgic gait pattern secondary to decreased R knee extension. Pt would benefit from skilled PT in order to promote painfree functional mobility and address aforementioned impairments.     Pt would benefit from skilled PT in order to promote painfree functional mobility and address aforementioned impairments.     Response to treatment: Pt verbalized good understanding of education provided. Pt demonstrated appropriate performance of HEP with good understanding. Did not exhibit exacerbation of symptoms at end of session.       PLAN:  OP PT PLAN:  Treatment/Interventions: Education/Instruction , Gait training , Manual Therapy  , Neuromuscular re-education , Self care/home management , Therapeutic activities , and Therapeutic exercise    PT Plan:  Skilled PT   PT Frequency: 1-2 times per week  Duration:12 visits   Certification Period Start Date:   Certification Period End Date:   Visits Approved: [facility] MEDICARE ADV - NO AUTH / [place] / $20 COPAY / OOP $4200 - $610 met  / REF:  [id number] / ds 7/1/24.  Rehab Potential: Good  Plan of Care Agreement: Patient    Goals:  SHORT TERM GOALS:  Patient will report decrease in pain from 6/10 to </= 3/10 to improve quality of life.   Patient will improve R knee AROM to WFL in order to improve gait mechanics and functional mobility  Patient will demonstrate improved 30 second sit to stand to from 9 to 11 for age normative values to demonstrate improved LE strength and function.   LONG TERM GOALS:  Patient will be independent with HEP to promote self management of condition  Patient will perform reciprocal stair negotiation to demonstrate improved LE strength.   Patient will ambulate with least restrictive device and proper gait mechanics to promote return to prior level of function.

## 2024-07-10 ENCOUNTER — TREATMENT (OUTPATIENT)
Dept: PHYSICAL THERAPY | Facility: CLINIC | Age: 69
End: 2024-07-10
Payer: MEDICARE

## 2024-07-10 ENCOUNTER — TELEPHONE (OUTPATIENT)
Dept: ORTHOPEDIC SURGERY | Facility: CLINIC | Age: 69
End: 2024-07-10

## 2024-07-10 DIAGNOSIS — M25.561 ACUTE PAIN OF RIGHT KNEE: ICD-10-CM

## 2024-07-10 DIAGNOSIS — Z96.651 STATUS POST RIGHT KNEE REPLACEMENT: ICD-10-CM

## 2024-07-10 PROCEDURE — 97110 THERAPEUTIC EXERCISES: CPT | Mod: GP,CQ

## 2024-07-10 RX ORDER — TRAMADOL HYDROCHLORIDE 50 MG/1
50 TABLET ORAL EVERY 8 HOURS PRN
Qty: 28 TABLET | Refills: 0 | Status: SHIPPED | OUTPATIENT
Start: 2024-07-10 | End: 2024-07-19

## 2024-07-10 RX ORDER — OXYCODONE HYDROCHLORIDE 5 MG/1
5 TABLET ORAL EVERY 8 HOURS PRN
Qty: 21 TABLET | Refills: 0 | Status: SHIPPED | OUTPATIENT
Start: 2024-07-10 | End: 2024-07-17

## 2024-07-10 NOTE — PROGRESS NOTES
"Physical Therapy    Physical Therapy Treatment    Patient Name: Thelma Orosco  MRN: [id number]  Today's Date: 7/10/2024    Time Calculation  Start Time: 0830  Stop Time: 0915  Time Calculation (min): 45 min    Visit #: 2 out of 24  Insurance: [facility] MEDICARE ADV - NO AUTH / [place] / $20 COPAY / OOP $4200 - $610 met  / REF:  [id number] / ds 7/1/24.  Evaluation date: 7/3/24  DOS: 5/29/24 R TKA    Current Problem:   1. Acute pain of right knee  Follow Up In Physical Therapy          SUBJECTIVE:   Pt notes to be doing well.  She experiences the typical stiffness in the R knee     Precautions: PMHx considerations: HTN, CAD, depression, hyperlipidemia, MI (5/27/2014)  STEADI Fall Risk: 2 (score of 4+ indicates fall risk)   R TKA (5/29/24)     Pain:   Start of session: 0/10        OBJECTIVE:    AROM R knee flex 110  PROM R knee flex 117    Treatments:  Therapeutic Exercise: (40 minutes)  Recumbent L1 x8min  DKTC via ball x2min  Bridge w/ ball 2x15  SLR review x15  S/L hip abd x15  -clamshell x15  Supine : AROM \"hackey sack\" x15  Slantboard stretch 6x10s  High knee march x2min  Leg press DL 80# 3x15      Manual Therapy: (5 minutes)  A/P shift at variable knee flex  PROM R knee flex/ext    Gait Training: ( minutes)    Neuromuscular Re-education: ( minutes)    Therapeutic Activity: ( minutes)       HEP:  Sidelying: hip abd and clamshell 2x15    ASSESSMENT:   Pt appears to have a nice foundation of ROM and strength to aide the transition of the functioning.  She expects to cont to transition despite the vacation next week.    Post session pain: No adverse effects.     PLAN:  OP PT PLAN:  Treatment/Interventions: Education/Instruction , Gait training , Manual Therapy  , Neuromuscular re-education , Self care/home management , Therapeutic activities , and Therapeutic exercise    PT Plan: Skilled PT   PT Frequency: 1-2 times per week  Duration:12 visits   Certification Period Start Date:   Certification Period End Date: "   Visits Approved: [facility] MEDICARE ADV - NO AUTH / [place] / $20 COPAY / OOP $4200 - $610 met  / REF:  [id number] / ds 7/1/24.  Rehab Potential: Good  Plan of Care Agreement: Patient         Goals:   SHORT TERM GOALS:  Patient will report decrease in pain from 6/10 to </= 3/10 to improve quality of life. -PROGRESSING  Patient will improve R knee AROM to WFL in order to improve gait mechanics and functional mobility-PROGRESSING  Patient will demonstrate improved 30 second sit to stand to from 9 to 11 for age normative values to demonstrate improved LE strength and function. -PROGRESSING  LONG TERM GOALS:  Patient will be independent with HEP to promote self management of condition-PROGRESSING  Patient will perform reciprocal stair negotiation to demonstrate improved LE strength. -PROGRESSING  Patient will ambulate with least restrictive device and proper gait mechanics to promote return to prior level of function-PROGRESSING

## 2024-07-10 NOTE — TELEPHONE ENCOUNTER
PT IS REQUESTINGA REFILL ON T  RAMADOL 50 MG- 4 TABS LEFT  OXYCODONE 5 MG-8 TABS LEFT    RT TKA ON 5-29-24    OAARS CHECKED AND SCANNED IN          [id number] - [place], [place] - [address] [facility]  [address] [facility] [address]  Phone: [phone] Fax: [phone]

## 2024-07-12 ENCOUNTER — TREATMENT (OUTPATIENT)
Dept: PHYSICAL THERAPY | Facility: CLINIC | Age: 69
End: 2024-07-12
Payer: MEDICARE

## 2024-07-12 DIAGNOSIS — M25.561 ACUTE PAIN OF RIGHT KNEE: ICD-10-CM

## 2024-07-12 PROCEDURE — 97110 THERAPEUTIC EXERCISES: CPT | Mod: GP | Performed by: PHYSICAL THERAPIST

## 2024-07-12 PROCEDURE — 97140 MANUAL THERAPY 1/> REGIONS: CPT | Mod: GP | Performed by: PHYSICAL THERAPIST

## 2024-07-12 NOTE — PROGRESS NOTES
"Physical Therapy    Physical Therapy Treatment    Patient Name: Thelma Orosco  MRN: [id number]  Today's Date: 7/12/2024    Time Calculation  Start Time: 1128  Stop Time: 1215  Time Calculation (min): 47 min    Visit #: 3 out of 24  Insurance: [facility] MEDICARE ADV - NO AUTH / [place] / $20 COPAY / OOP $4200 - $610 met  / REF:  [id number] / ds 7/1/24.  Evaluation date: 7/3/24  DOS: 5/29/24    Current Problem:   1. Acute pain of right knee  Follow Up In Physical Therapy          SUBJECTIVE:   Patient will be leaving for [place] this evening.  Pt notes increased soreness today.  States that she has been doing the clamshell exercises and feels like her soreness may be attributed to those.  States balance is good; \"I am very aware of what I am doing.\"  \"I have been gardening without have any issues.\"     Precautions: PMHx considerations: HTN, CAD, depression, hyperlipidemia, MI (5/27/2014)  STEADI Fall Risk: 2 (score of 4+ indicates fall risk)        Pain:   Start of session: 5/10        OBJECTIVE:    AROM R knee flex 110    Treatments:  Therapeutic Exercise: (30 minutes)  Recumbent L1 x 8min  Quad setting with towel 3 x 10 reps.  SLR 3 x 10 reps.  DKTC via ball x2 min  Bridge 3 x 10 reps  S/L hip abd  2 x 10   Supine : AROM \"hackey sack\" x15  Slantboard stretch 6x10s  High knee march x2min    Manual Therapy: (15 minutes)  Supine: STM to right quad/right IT band.    HEP:  Sidelying: hip abd and clamshell 2x15    ASSESSMENT:   The patient did well with today's activities; noted pain reduction after manual therapy.  Gait quality normalizing.  Pt compliant with home program.    Post session pain:  0/10     PLAN:  OP PT PLAN:  Treatment/Interventions: Education/Instruction , Gait training , Manual Therapy  , Neuromuscular re-education , Self care/home management , Therapeutic activities , and Therapeutic exercise    PT Plan: Skilled PT   PT Frequency: 1-2 times per week  Duration:12 visits   Certification Period " Start Date:   Certification Period End Date:   Visits Approved: [facility] MEDICARE ADV - NO AUTH / [place] / $20 COPAY / OOP $4200 - $610 met  / REF:  [id number] / ds 7/1/24.  Rehab Potential: Good  Plan of Care Agreement: Patient         Goals:   SHORT TERM GOALS:  Patient will report decrease in pain from 6/10 to </= 3/10 to improve quality of life. -MET  Patient will improve R knee AROM to WFL in order to improve gait mechanics and functional mobility-MET  Patient will demonstrate improved 30 second sit to stand to from 9 to 11 for age normative values to demonstrate improved LE strength and function.   LONG TERM GOALS:  Patient will be independent with HEP to promote self management of condition  Patient will perform reciprocal stair negotiation to demonstrate improved LE strength.   Patient will ambulate with least restrictive device and proper gait mechanics to promote return to prior level of function

## 2024-07-22 ENCOUNTER — APPOINTMENT (OUTPATIENT)
Dept: PHYSICAL THERAPY | Facility: CLINIC | Age: 69
End: 2024-07-22
Payer: MEDICARE

## 2024-07-22 DIAGNOSIS — M25.561 ACUTE PAIN OF RIGHT KNEE: ICD-10-CM

## 2024-07-22 PROCEDURE — 97110 THERAPEUTIC EXERCISES: CPT | Mod: GP

## 2024-07-22 NOTE — PROGRESS NOTES
"Physical Therapy    Physical Therapy Treatment    Patient Name: Thelma Orosco  MRN: [id number]  Today's Date: 7/22/2024    Time Calculation  Start Time: 1100  Stop Time: 1143  Time Calculation (min): 43 min    Visit #: 4 out of 12  Insurance: [facility] MEDICARE ADV - NO AUTH / [place] / $20 COPAY / OOP $4200 - $610 met  / REF:  [id number] / ds 7/1/24.  Evaluation date: 7/3/24  DOS: 5/29/24 s/p R TKA     Current Problem:   1. Acute pain of right knee  Follow Up In Physical Therapy          SUBJECTIVE:   Patient reports that she still has pain on lateral aspect of knee but has improved. Patient reports that she did not go on sand while on vacation secondary to knee. Patient reports that she drove and had to stop secondary to knee discomfort.      Precautions: PMHx considerations: HTN, CAD, depression, hyperlipidemia, MI (5/27/2014)  STEADI Fall Risk: 2 (score of 4+ indicates fall risk)        Pain:  Start of session: 3/10        OBJECTIVE:    R knee AROM: 110-3 degress     Treatments:  Therapeutic Exercise: (43 minutes)  Recumbent L1 x 10 minutes   4\" step up x15 ea. (SA support)  6\" step up/down x15 ea.   Slomo march for 20 ft x6   Leg press DL 80# 3x15   DKTC via ball x20  Physioball bridge 2x10 ea.     Manual Therapy: (minutes)    HEP:  Sidelying: hip abd and clamshell 2x15    HHPT from HEP:  Supine: QS with towel, SAQ, SLR, Bridge, hip ABD, HS stretch, heel slide, ankle pumps  Seated: LAQ, marching, knee flexion   Standing: hip ABD, marching, HS curl, mini squat, heel raises    ASSESSMENT:   Patient presents 7 weeks 5 days s/p R TKA. Patient was able to progress stair negotiation to improve overall function and strength. Patient continues to progress through protocol without increase in pain. Patient demonstrates improvements in R knee flexion and extension but remains lacking full knee extension impacting gait mechanics, will continue to progress as able.     Post session pain:  0/10     PLAN:  OP PT " PLAN:  Treatment/Interventions: Education/Instruction , Gait training , Manual Therapy  , Neuromuscular re-education , Self care/home management , Therapeutic activities , and Therapeutic exercise    PT Plan: Skilled PT   PT Frequency: 1-2 times per week  Duration:12 visits     Visits Approved: [facility] MEDICARE ADV - NO AUTH / [place] / $20 COPAY / OOP $4200 - $610 met  / REF:  [id number] / ds 7/1/24.  Rehab Potential: Good  Plan of Care Agreement: Patient         Goals:   SHORT TERM GOALS:  Patient will report decrease in pain from 6/10 to </= 3/10 to improve quality of life. -PROGRESSING  Patient will improve R knee AROM to WFL in order to improve gait mechanics and functional mobility-PROGRESSING  Patient will demonstrate improved 30 second sit to stand to from 9 to 11 for age normative values to demonstrate improved LE strength and function. -PROGRESSING  LONG TERM GOALS:  Patient will be independent with HEP to promote self management of condition-PROGRESSING  Patient will perform reciprocal stair negotiation to demonstrate improved LE strength. -PROGRESSING  Patient will ambulate with least restrictive device and proper gait mechanics to promote return to prior level of function-PROGRESSING

## 2024-07-25 ENCOUNTER — TREATMENT (OUTPATIENT)
Dept: PHYSICAL THERAPY | Facility: CLINIC | Age: 69
End: 2024-07-25
Payer: MEDICARE

## 2024-07-25 DIAGNOSIS — M25.561 ACUTE PAIN OF RIGHT KNEE: ICD-10-CM

## 2024-07-25 PROCEDURE — 97110 THERAPEUTIC EXERCISES: CPT | Mod: GP

## 2024-07-25 NOTE — PROGRESS NOTES
"Physical Therapy    Physical Therapy Treatment    Patient Name: Thelma Orosco  MRN: [id number]  Today's Date: 7/25/2024    Time Calculation  Start Time: 1100  Stop Time: 1143  Time Calculation (min): 43 min    Visit #: 5 out of 12  Insurance: [facility] MEDICARE ADV - NO AUTH / [place] / $20 COPAY / OOP $4200 - $610 met  / REF:  [id number] / ds 7/1/24.  Evaluation date: 7/3/24  DOS: 5/29/24 s/p R TKA     Current Problem:   1. Acute pain of right knee  Follow Up In Physical Therapy          SUBJECTIVE:   Patient reports that knee is just a little tight today. Patient states that she has been doing HEP and aquatic exercises.       Precautions: PMHx considerations: HTN, CAD, depression, hyperlipidemia, MI (5/27/2014)  STEADI Fall Risk: 2 (score of 4+ indicates fall risk)        Pain:  Start of session: 3/10        OBJECTIVE:    R knee AROM: 110-3 degress     Treatments:  Therapeutic Exercise: (43 minutes)  Recumbent L1 x 10 minutes   6\" step up x15 /down x10 ea.   6\" lateral step up x15 ea.   Slomo march for 20 ft x6   Side steps 20 ft x6   Sit to stand from varying heights of mat table (x3) x10 ea.   Supine physioball heel slide 2x15   Physioball bridge 3x10 ea.     Manual Therapy: (minutes)    HEP:  Sidelying: hip abd and clamshell 2x15    HHPT from HEP:  Supine: QS with towel, SAQ, SLR, Bridge, hip ABD, HS stretch, heel slide, ankle pumps  Seated: LAQ, marching, knee flexion   Standing: hip ABD, marching, HS curl, mini squat, heel raises    ASSESSMENT:   Patient presents 8 weeks s/p R TKA. Patient continues to progress well through protocol. Patient demonstrated improved stair negotiation without increase in pain. Patient demonstrated sit to stand from varying heights of mat table to improve functional mobility and LE strength. Patient encouraged to perform within HEP.     Post session pain:  0/10     PLAN:  OP PT PLAN:  Treatment/Interventions: Education/Instruction , Gait training , Manual Therapy  , " Neuromuscular re-education , Self care/home management , Therapeutic activities , and Therapeutic exercise    PT Plan: Skilled PT   PT Frequency: 1-2 times per week  Duration:12 visits     Visits Approved: [facility] MEDICARE ADV - NO AUTH / [place] / $20 COPAY / OOP $4200 - $610 met  / REF:  [id number] / ds 7/1/24.  Rehab Potential: Good  Plan of Care Agreement: Patient         Goals:   SHORT TERM GOALS:  Patient will report decrease in pain from 6/10 to </= 3/10 to improve quality of life. -PROGRESSING  Patient will improve R knee AROM to WFL in order to improve gait mechanics and functional mobility-PROGRESSING  Patient will demonstrate improved 30 second sit to stand to from 9 to 11 for age normative values to demonstrate improved LE strength and function. -PROGRESSING  LONG TERM GOALS:  Patient will be independent with HEP to promote self management of condition-PROGRESSING  Patient will perform reciprocal stair negotiation to demonstrate improved LE strength. -PROGRESSING  Patient will ambulate with least restrictive device and proper gait mechanics to promote return to prior level of function-PROGRESSING

## 2024-07-29 ENCOUNTER — TREATMENT (OUTPATIENT)
Dept: PHYSICAL THERAPY | Facility: CLINIC | Age: 69
End: 2024-07-29
Payer: MEDICARE

## 2024-07-29 DIAGNOSIS — M25.561 ACUTE PAIN OF RIGHT KNEE: ICD-10-CM

## 2024-07-29 PROCEDURE — 97110 THERAPEUTIC EXERCISES: CPT | Mod: GP

## 2024-07-29 NOTE — PROGRESS NOTES
"Physical Therapy    Physical Therapy Treatment    Patient Name: Thelma Orosco  MRN: [id number]  Today's Date: 7/29/2024    Time Calculation  Start Time: 1102  Stop Time: 1145  Time Calculation (min): 43 min    Visit #: 6 out of 12  Insurance: [facility] MEDICARE ADV - NO AUTH / [place] / $20 COPAY / OOP $4200 - $610 met  / REF:  [id number] / ds 7/1/24.  Evaluation date: 7/3/24  DOS: 5/29/24 s/p R TKA     Current Problem:   1. Acute pain of right knee  Follow Up In Physical Therapy          SUBJECTIVE:   Patient reports that knee is more stiff today. Patient reports that she was sore after last visit.      Precautions: PMHx considerations: HTN, CAD, depression, hyperlipidemia, MI (5/27/2014)  STEADI Fall Risk: 2 (score of 4+ indicates fall risk)        Pain:  Start of session: 0/10        OBJECTIVE:    7/29/24:   30 second sit to stand: 13 seconds (EVAL: 9 seconds)    R knee AROM: 115-1 degress     Previous:  R knee AROM: 110-3 degress     Treatments:  Therapeutic Exercise: (43 minutes)  Recumbent L1 x 10 minutes   Slomo march for 20 ft x6   Side steps 20 ft x6   Sit to stand  Standing R TKE 3x10  6\" step up/down x15 ea.   Leg press # 3x15 (increased resistance)   Supine physioball heel slide x20     Manual Therapy: (minutes)    HEP:  Sidelying: hip abd and clamshell 2x15    HHPT from HEP:  Supine: QS with towel, SAQ, SLR, Bridge, hip ABD, HS stretch, heel slide, ankle pumps  Seated: LAQ, marching, knee flexion   Standing: hip ABD, marching, HS curl, mini squat, heel raises    ASSESSMENT:   Patient presents 8 weeks 4 days s/p R TKA. Patient has met 2/3 short term goals and 1/3 long term goals. Patient partially met 1 long term goal secondary to lack of consisently of reciprocal stair negotiation, reports improvements in ascending but hesitates with descending secondary to increased height of step. Patient demonstrates improvements in overall pain levels, LE strength and endurance through 30 second sit to " stand.     Post session pain:  0/10     PLAN:  OP PT PLAN:  Treatment/Interventions: Education/Instruction , Gait training , Manual Therapy  , Neuromuscular re-education , Self care/home management , Therapeutic activities , and Therapeutic exercise    PT Plan: Skilled PT   PT Frequency: 1-2 times per week  Duration:12 visits     Visits Approved: [employer] MEDICARE ADV - NO AUTH / [place] / $20 COPAY / OOP $4200 - $610 met  / REF:  [id number] / ds 7/1/24.  Rehab Potential: Good  Plan of Care Agreement: Patient         Goals:   SHORT TERM GOALS:  Patient will report decrease in pain from 6/10 to </= 3/10 to improve quality of life. -MET  Patient will improve R knee AROM to WFL in order to improve gait mechanics and functional mobility-PROGRESSING  Patient will demonstrate improved 30 second sit to stand to from 9 to 11 for age normative values to demonstrate improved LE strength and function. -MET  LONG TERM GOALS:  Patient will be independent with HEP to promote self management of condition-MET  Patient will perform reciprocal stair negotiation to demonstrate improved LE strength. -PARTIAL MET (inconsistent performance, ascending reciprocal, descending occasional reciprocal)   Patient will ambulate with least restrictive device and proper gait mechanics to promote return to prior level of function-PROGRESSING

## 2024-08-01 ENCOUNTER — TREATMENT (OUTPATIENT)
Dept: PHYSICAL THERAPY | Facility: CLINIC | Age: 69
End: 2024-08-01
Payer: MEDICARE

## 2024-08-01 DIAGNOSIS — M25.561 ACUTE PAIN OF RIGHT KNEE: ICD-10-CM

## 2024-08-01 PROCEDURE — 97110 THERAPEUTIC EXERCISES: CPT | Mod: GP

## 2024-08-01 NOTE — PROGRESS NOTES
"Physical Therapy    Physical Therapy Treatment    Patient Name: Thelma Orosco \"Timothy\"  MRN: [id number]  Today's Date: 8/1/2024    Time Calculation  Start Time: 1100  Stop Time: 1144  Time Calculation (min): 44 min    Visit #: 7 out of 12  Insurance: [facility] MEDICARE ADV - NO AUTH / [place] / $20 COPAY / OOP $4200 - $610 met  / REF:  [id number] / ds 7/1/24.  Evaluation date: 7/3/24  DOS: 5/29/24 s/p R TKA     Current Problem:   1. Acute pain of right knee  Follow Up In Physical Therapy          SUBJECTIVE:   Patient reports that knee is overall ok, just outside of knee is bothersome. Patient reports that when she is resting at night can get jabbing pain, feels tightening. Patient reports that she has been in pool and doing a lot of work.      Precautions: PMHx considerations: HTN, CAD, depression, hyperlipidemia, MI (5/27/2014)  STEADI Fall Risk: 2 (score of 4+ indicates fall risk)        Pain:  Start of session: 0/10        OBJECTIVE:    7/29/24:   30 second sit to stand: 13 seconds (EVAL: 9 seconds)    R knee AROM: 115-1 degress     Previous:  R knee AROM: 110-3 degress     Treatments:  Therapeutic Exercise: (43 minutes)  Recumbent L1 x 10 minutes   Leg press # 3x15; SL 60# 2x15   Side steps vs sport cord (x2) 2x10   6\" step up x15 ea. (No UE support)   R TKA vs sport cord (x1) 2x15     Manual Therapy: (minutes)    HEP:  Sidelying: hip abd and clamshell 2x15    HHPT from HEP:  Supine: QS with towel, SAQ, SLR, Bridge, hip ABD, HS stretch, heel slide, ankle pumps  Seated: LAQ, marching, knee flexion   Standing: hip ABD, marching, HS curl, mini squat, heel raises    ASSESSMENT:   Patient presents 9 weeks s/p R TKA. Patient was able to progress LE strengthening to support improved function such as stair negotiation and LE stability. Patient continues to progress well through POC.     Post session pain:  0/10     PLAN:  OP PT PLAN:  Treatment/Interventions: Education/Instruction , Gait training , Manual " Therapy  , Neuromuscular re-education , Self care/home management , Therapeutic activities , and Therapeutic exercise    PT Plan: Skilled PT   PT Frequency: 1-2 times per week  Duration:12 visits     Visits Approved: [facility] MEDICARE ADV - NO AUTH / [place] / $20 COPAY / OOP $4200 - $610 met  / REF:  [id number] / ds 7/1/24.  Rehab Potential: Good  Plan of Care Agreement: Patient         Goals:   SHORT TERM GOALS:  Patient will report decrease in pain from 6/10 to </= 3/10 to improve quality of life. -MET  Patient will improve R knee AROM to WFL in order to improve gait mechanics and functional mobility-PROGRESSING  Patient will demonstrate improved 30 second sit to stand to from 9 to 11 for age normative values to demonstrate improved LE strength and function. -MET  LONG TERM GOALS:  Patient will be independent with HEP to promote self management of condition-MET  Patient will perform reciprocal stair negotiation to demonstrate improved LE strength. -PARTIAL MET (inconsistent performance, ascending reciprocal, descending occasional reciprocal)   Patient will ambulate with least restrictive device and proper gait mechanics to promote return to prior level of function-PROGRESSING

## 2024-08-09 ENCOUNTER — TREATMENT (OUTPATIENT)
Dept: PHYSICAL THERAPY | Facility: CLINIC | Age: 69
End: 2024-08-09
Payer: MEDICARE

## 2024-08-09 DIAGNOSIS — M25.561 ACUTE PAIN OF RIGHT KNEE: ICD-10-CM

## 2024-08-09 PROCEDURE — 97110 THERAPEUTIC EXERCISES: CPT | Mod: GP

## 2024-08-09 NOTE — PROGRESS NOTES
"Physical Therapy    Physical Therapy Treatment and Discharge Summary     Patient Name: Thelma Orosco \"Timothy\"  MRN: [id number]  Today's Date: 8/9/2024    Time Calculation  Start Time: 1002  Stop Time: 1045  Time Calculation (min): 43 min    Visit #: 8 out of 12  Insurance: [facility] MEDICARE ADV - NO AUTH / [place] / $20 COPAY / OOP $4200 - $610 met  / REF:  [id number] / ds 7/1/24.  Evaluation date: 7/3/24  DOS: 5/29/24 s/p R TKA     Current Problem:   1. Acute pain of right knee  Follow Up In Physical Therapy          SUBJECTIVE:   Patient reports that R lateral leg bothered her through the night and into this morning. Patient reports that she had to take oxycodone. Patient describes pain as pressure. Pain comes and goes, since surgery.      Precautions: PMHx considerations: HTN, CAD, depression, hyperlipidemia, MI (5/27/2014)  STEADI Fall Risk: 2 (score of 4+ indicates fall risk)        Pain:  Start of session: 0-5/10        OBJECTIVE:    8/9/24: R knee AROM 115-0 degrees     7/29/24:   30 second sit to stand: 13 seconds (EVAL: 9 seconds)    R knee AROM: 115-1 degress     Previous:  R knee AROM: 110-3 degress     Treatments:  Therapeutic Exercise: (43 minutes)  Recumbent L3 x 10 minutes   6\" step up/down x10 ea.   6\" step up to SLS x15 ea.   Side steps vs green TB for 25ft x6   Leg press # 2x20; SL 60# 2x15 ea,     Manual Therapy: (minutes)    HEP:  Sidelying: hip abd and clamshell 2x15    HHPT from HEP:  Supine: QS with towel, SAQ, SLR, Bridge, hip ABD, HS stretch, heel slide, ankle pumps  Seated: LAQ, marching, knee flexion   Standing: hip ABD, marching, HS curl, mini squat, heel raises    ASSESSMENT:   Patient presents 10 weeks 1 day s/p R TKA. Patient made good progress through POC. Patient has met all short term and long term goals at this date. Updated HEP to continue to maintain progress. Patient appropriate for discharge at this date.     Post session pain:  0/10     PLAN:  OP PT " PLAN:  Treatment/Interventions: Education/Instruction , Gait training , Manual Therapy  , Neuromuscular re-education , Self care/home management , Therapeutic activities , and Therapeutic exercise    PT Plan: Skilled PT   PT Frequency: 1-2 times per week  Duration:12 visits     Visits Approved: [employer] MEDICARE ADV - NO AUTH / [place] / $20 COPAY / OOP $4200 - $610 met  / REF:  [id number] / ds 7/1/24.  Rehab Potential: Good  Plan of Care Agreement: Patient         Goals:   SHORT TERM GOALS:  Patient will report decrease in pain from 6/10 to </= 3/10 to improve quality of life. -MET  Patient will improve R knee AROM to WFL in order to improve gait mechanics and functional mobility-MET  Patient will demonstrate improved 30 second sit to stand to from 9 to 11 for age normative values to demonstrate improved LE strength and function. -MET  LONG TERM GOALS:  Patient will be independent with HEP to promote self management of condition-MET  Patient will perform reciprocal stair negotiation to demonstrate improved LE strength. -MET  Patient will ambulate with least restrictive device and proper gait mechanics to promote return to prior level of function-MET

## 2024-10-01 ENCOUNTER — HOSPITAL ENCOUNTER (OUTPATIENT)
Dept: RADIOLOGY | Facility: CLINIC | Age: 69
Discharge: HOME | End: 2024-10-01
Payer: MEDICARE

## 2024-10-01 ENCOUNTER — APPOINTMENT (OUTPATIENT)
Dept: ORTHOPEDIC SURGERY | Facility: CLINIC | Age: 69
End: 2024-10-01
Payer: MEDICARE

## 2024-10-01 VITALS — BODY MASS INDEX: 32.96 KG/M2 | WEIGHT: 186 LBS | HEIGHT: 63 IN

## 2024-10-01 DIAGNOSIS — Z96.651 STATUS POST RIGHT KNEE REPLACEMENT: ICD-10-CM

## 2024-10-01 PROCEDURE — 1160F RVW MEDS BY RX/DR IN RCRD: CPT | Performed by: ORTHOPAEDIC SURGERY

## 2024-10-01 PROCEDURE — 73560 X-RAY EXAM OF KNEE 1 OR 2: CPT | Mod: RIGHT SIDE | Performed by: RADIOLOGY

## 2024-10-01 PROCEDURE — 73560 X-RAY EXAM OF KNEE 1 OR 2: CPT | Mod: RT

## 2024-10-01 PROCEDURE — 99213 OFFICE O/P EST LOW 20 MIN: CPT | Performed by: ORTHOPAEDIC SURGERY

## 2024-10-01 PROCEDURE — 1159F MED LIST DOCD IN RCRD: CPT | Performed by: ORTHOPAEDIC SURGERY

## 2024-10-01 PROCEDURE — 3008F BODY MASS INDEX DOCD: CPT | Performed by: ORTHOPAEDIC SURGERY

## 2024-10-01 PROCEDURE — 1036F TOBACCO NON-USER: CPT | Performed by: ORTHOPAEDIC SURGERY

## 2024-10-02 NOTE — PROGRESS NOTES
69-year-old is seen following right total knee arthroplasty May 29, 2024.  She is doing very well and is very satisfied with her progress.  No significant pain is improved significantly from her preoperative symptoms.    Pleasant in no acute distress.  Walks with a normal gait.  Right knee range of motion is 0 to 120 degrees with no instability.  No tenderness.    Multiple x-ray views of the right knee are personally reviewed and the right total knee arthroplasty is in good position and well-fixed.    She has progressed very well.  She continue with the exercise program.  Antibiotic prophylaxis and precautions were reviewed.  She will follow-up at 1 year from surgery with x-rays of the knee.

## 2025-02-05 ENCOUNTER — HOSPITAL ENCOUNTER (OUTPATIENT)
Dept: RADIOLOGY | Facility: HOSPITAL | Age: 70
Discharge: HOME | End: 2025-02-05
Payer: MEDICARE

## 2025-02-05 VITALS — HEIGHT: 63 IN | WEIGHT: 186 LBS | BODY MASS INDEX: 32.96 KG/M2

## 2025-02-05 DIAGNOSIS — Z12.31 ENCOUNTER FOR SCREENING MAMMOGRAM FOR MALIGNANT NEOPLASM OF BREAST: ICD-10-CM

## 2025-02-05 PROCEDURE — 77063 BREAST TOMOSYNTHESIS BI: CPT

## 2025-02-17 ENCOUNTER — HOSPITAL ENCOUNTER (OUTPATIENT)
Dept: RADIOLOGY | Facility: EXTERNAL LOCATION | Age: 70
Discharge: HOME | End: 2025-02-17
Payer: MEDICARE

## (undated) DEVICE — BLADE, SAW, SAGITTAL, 12.5 X 81.5 X 1.27 MM, STAINLESS STEEL, STERILE

## (undated) DEVICE — BANDAGE, COFLEX, 6 X 5 YDS, FOAM TAN, STERILE, LF

## (undated) DEVICE — CUFF, TOURNIQUET, 34 X 4, DUAL PORT/SNGL BLADDER, DISP, LF

## (undated) DEVICE — APPLICATOR, CHLORAPREP, W/ORANGE TINT, 26ML

## (undated) DEVICE — Device

## (undated) DEVICE — GOWN, SURGICAL, IMPLT, BACK, XLARGE, XLONG, STERILE

## (undated) DEVICE — STRIP, SKIN CLOSURE, STERI STRIP, REINFORCED, 0.5 X 4 IN

## (undated) DEVICE — BLADE, SAGITTAL DUAL CUT, 25 X 90 X 1.27

## (undated) DEVICE — BANDAGE, ELASTIC, ACE, ACE, DOUBLE LENGTH, 6 X 550 IN, LF

## (undated) DEVICE — MAT, AIR TRANSFER, 39X81

## (undated) DEVICE — SYRINGE, 50 CC, LUER LOCK

## (undated) DEVICE — 14IN MEPILEX BORDER POST OP AG FOAM DRESSING, STERILE

## (undated) DEVICE — DRAPE, TIBURON, SPLIT SHEET, REINF ADH STRIP, 77X108

## (undated) DEVICE — SPONGE, LAP, XRAY DECT, 18IN X 18IN, W/MASTER DMT, STERILE

## (undated) DEVICE — DRAPE, SHEET, THREE QUARTER, FAN FOLD, 57 X 77 IN

## (undated) DEVICE — SLEEVE, VASO PRESS, CALF GARMENT, MEDIUM, GREEN

## (undated) DEVICE — WOUND SYSTEM, DEBRIDEMENT & CLEANING, O.R DUOPAK

## (undated) DEVICE — DRESSING, MEPILEX BORDER, POST-OP AG, 4 X 10 IN

## (undated) DEVICE — TIP, SUCTION, SUPER SUCKER, KAM, MINI, CURVED

## (undated) DEVICE — DRAPE, U-DRAPE, NON STERILE

## (undated) DEVICE — BASIN KIT, SINGLE

## (undated) DEVICE — DRAPE, SHEET, VI, W/BETADINE

## (undated) DEVICE — PADDING, CAST, WYTEX, 6 IN X 4 YD, LF

## (undated) DEVICE — MARKER, SKIN, REGULAR TIP, W/FLEXI-RULER

## (undated) DEVICE — BANDAGE, ESMARK, 6 IN X 9 FT, STERILE

## (undated) DEVICE — GOWN, ASTOUND, XL

## (undated) DEVICE — CEMENT, MIXEVAC III, 10S BOWL, KNEES